# Patient Record
Sex: FEMALE | Race: WHITE | NOT HISPANIC OR LATINO
[De-identification: names, ages, dates, MRNs, and addresses within clinical notes are randomized per-mention and may not be internally consistent; named-entity substitution may affect disease eponyms.]

---

## 2017-03-09 ENCOUNTER — APPOINTMENT (OUTPATIENT)
Dept: BARIATRICS | Facility: CLINIC | Age: 62
End: 2017-03-09

## 2017-03-09 VITALS
WEIGHT: 226.5 LBS | HEIGHT: 62 IN | BODY MASS INDEX: 41.68 KG/M2 | DIASTOLIC BLOOD PRESSURE: 84 MMHG | SYSTOLIC BLOOD PRESSURE: 130 MMHG

## 2017-03-09 DIAGNOSIS — Z98.84 BARIATRIC SURGERY STATUS: ICD-10-CM

## 2017-03-09 DIAGNOSIS — E66.01 MORBID (SEVERE) OBESITY DUE TO EXCESS CALORIES: ICD-10-CM

## 2017-03-09 RX ORDER — PANTOPRAZOLE 40 MG/1
TABLET, DELAYED RELEASE ORAL
Refills: 0 | Status: ACTIVE | COMMUNITY

## 2017-04-21 ENCOUNTER — OUTPATIENT (OUTPATIENT)
Dept: OUTPATIENT SERVICES | Facility: HOSPITAL | Age: 62
LOS: 1 days | End: 2017-04-21

## 2017-05-05 ENCOUNTER — OUTPATIENT (OUTPATIENT)
Dept: OUTPATIENT SERVICES | Facility: HOSPITAL | Age: 62
LOS: 1 days | End: 2017-05-05

## 2017-05-05 ENCOUNTER — INPATIENT (INPATIENT)
Facility: HOSPITAL | Age: 62
LOS: 2 days | Discharge: NURSING, REHAB OR LONG-TERM CARE | End: 2017-05-08
Payer: MEDICARE

## 2017-05-05 PROCEDURE — 73560 X-RAY EXAM OF KNEE 1 OR 2: CPT | Mod: 26,RT

## 2017-05-06 ENCOUNTER — OUTPATIENT (OUTPATIENT)
Dept: OUTPATIENT SERVICES | Facility: HOSPITAL | Age: 62
LOS: 1 days | End: 2017-05-06

## 2017-05-07 ENCOUNTER — OUTPATIENT (OUTPATIENT)
Dept: OUTPATIENT SERVICES | Facility: HOSPITAL | Age: 62
LOS: 1 days | End: 2017-05-07

## 2017-05-08 ENCOUNTER — OUTPATIENT (OUTPATIENT)
Dept: OUTPATIENT SERVICES | Facility: HOSPITAL | Age: 62
LOS: 1 days | End: 2017-05-08

## 2017-05-08 ENCOUNTER — INPATIENT (INPATIENT)
Facility: HOSPITAL | Age: 62
LOS: 4 days | Discharge: HOME | End: 2017-05-13

## 2018-03-19 ENCOUNTER — APPOINTMENT (OUTPATIENT)
Dept: OBGYN | Facility: CLINIC | Age: 63
End: 2018-03-19

## 2018-11-09 ENCOUNTER — APPOINTMENT (OUTPATIENT)
Dept: OBGYN | Facility: CLINIC | Age: 63
End: 2018-11-09
Payer: MEDICARE

## 2018-11-09 VITALS
WEIGHT: 230 LBS | BODY MASS INDEX: 42.33 KG/M2 | DIASTOLIC BLOOD PRESSURE: 78 MMHG | RESPIRATION RATE: 16 BRPM | HEIGHT: 62 IN | TEMPERATURE: 98.5 F | SYSTOLIC BLOOD PRESSURE: 122 MMHG

## 2018-11-09 DIAGNOSIS — E66.9 OBESITY, UNSPECIFIED: ICD-10-CM

## 2018-11-09 LAB
BILIRUB UR QL STRIP: NORMAL
CLARITY UR: CLEAR
COLLECTION METHOD: NORMAL
GLUCOSE UR-MCNC: NORMAL
HCG UR QL: 0.2 EU/DL
HEMOGLOBIN: 15.8
HGB UR QL STRIP.AUTO: NORMAL
KETONES UR-MCNC: NORMAL
LEUKOCYTE ESTERASE UR QL STRIP: NORMAL
NITRITE UR QL STRIP: NORMAL
PH UR STRIP: 5
PROT UR STRIP-MCNC: NORMAL
SP GR UR STRIP: 1.02

## 2018-11-09 PROCEDURE — 81003 URINALYSIS AUTO W/O SCOPE: CPT | Mod: QW

## 2018-11-09 PROCEDURE — G0101: CPT

## 2018-11-09 PROCEDURE — 85018 HEMOGLOBIN: CPT | Mod: QW

## 2018-11-14 LAB — CYTOLOGY CVX/VAG DOC THIN PREP: NORMAL

## 2019-12-05 ENCOUNTER — APPOINTMENT (OUTPATIENT)
Dept: OBGYN | Facility: CLINIC | Age: 64
End: 2019-12-05

## 2021-10-07 ENCOUNTER — APPOINTMENT (OUTPATIENT)
Dept: OBGYN | Facility: CLINIC | Age: 66
End: 2021-10-07
Payer: MEDICARE

## 2021-10-07 ENCOUNTER — NON-APPOINTMENT (OUTPATIENT)
Age: 66
End: 2021-10-07

## 2021-10-07 VITALS
DIASTOLIC BLOOD PRESSURE: 68 MMHG | HEIGHT: 62 IN | WEIGHT: 230 LBS | SYSTOLIC BLOOD PRESSURE: 124 MMHG | BODY MASS INDEX: 42.33 KG/M2

## 2021-10-07 DIAGNOSIS — N39.41 URGE INCONTINENCE: ICD-10-CM

## 2021-10-07 DIAGNOSIS — Z01.419 ENCOUNTER FOR GYNECOLOGICAL EXAMINATION (GENERAL) (ROUTINE) W/OUT ABNORMAL FINDINGS: ICD-10-CM

## 2021-10-07 PROCEDURE — 82270 OCCULT BLOOD FECES: CPT

## 2021-10-07 PROCEDURE — G0101: CPT

## 2021-10-07 RX ORDER — MIRABEGRON 25 MG/1
25 TABLET, FILM COATED, EXTENDED RELEASE ORAL DAILY
Qty: 30 | Refills: 6 | Status: ACTIVE | COMMUNITY
Start: 2018-11-09 | End: 1900-01-01

## 2021-10-07 NOTE — REVIEW OF SYSTEMS
[Patient Intake Form Reviewed] : Patient intake form was reviewed [Urgency] : urgency [Incontinence] : incontinence [Negative] : Heme/Lymph

## 2021-10-07 NOTE — COUNSELING
[Nutrition/ Exercise/ Weight Management] : nutrition, exercise, weight management [Vitamins/Supplements] : vitamins/supplements [Breast Self Exam] : breast self exam [Bladder Hygiene] : bladder hygiene [Vaccines] : vaccines [Influenza Vaccine] : influenza vaccine

## 2021-10-07 NOTE — PLAN
[FreeTextEntry1] : PATIENT PRESENTS WITH URINARY INCONTINENCE. CLINICAL HISTORY REVIEWED. EXAMINATION PERFORMED. FINDINGS DISCUSSED. DISCUSSED TREATMENT OPTIONS WHICH INCLUDE MEDICAL THERAPY WITH ORAL ANTICHOLINERGICS, MYRBETIQ, BLADDER BOTOX, OR INTERSTIM. ALL OPTIONS REVIEWED WITH PATIENT. CONSULTATION ARRANGED WITH UROGYNECOLOGY

## 2021-10-07 NOTE — PHYSICAL EXAM
[Appropriately responsive] : appropriately responsive [Alert] : alert [No Acute Distress] : no acute distress [No Lymphadenopathy] : no lymphadenopathy [Regular Rate Rhythm] : regular rate rhythm [No Murmurs] : no murmurs [Clear to Auscultation B/L] : clear to auscultation bilaterally [Soft] : soft [Non-tender] : non-tender [Non-distended] : non-distended [No HSM] : No HSM [No Lesions] : no lesions [No Mass] : no mass [Oriented x3] : oriented x3 [Examination Of The Breasts] : a normal appearance [No Masses] : no breast masses were palpable [Labia Majora] : normal [Labia Minora] : normal [Atrophy] : atrophy [Uterine Adnexae] : normal [Normal rectal exam] : was normal [Normal Brown Stool] : was normal and brown [Occult Blood Positive] : was negative for occult blood analysis [Internal Hemorrhoid] : no internal hemorrhoids were present [External Hemorrhoid] : no external hemorrhoids were present [Skin Tags] : no residual hemorrhoidal skin tags [Normal] : was normal [None] : there was no rectal mass

## 2022-06-25 ENCOUNTER — EMERGENCY (EMERGENCY)
Facility: HOSPITAL | Age: 67
LOS: 0 days | Discharge: HOME | End: 2022-06-25
Attending: EMERGENCY MEDICINE
Payer: MEDICARE

## 2022-06-25 VITALS
HEART RATE: 80 BPM | SYSTOLIC BLOOD PRESSURE: 125 MMHG | DIASTOLIC BLOOD PRESSURE: 71 MMHG | RESPIRATION RATE: 16 BRPM | OXYGEN SATURATION: 99 % | TEMPERATURE: 98 F

## 2022-06-25 VITALS — HEIGHT: 62 IN | WEIGHT: 229.94 LBS

## 2022-06-25 DIAGNOSIS — S80.12XA CONTUSION OF LEFT LOWER LEG, INITIAL ENCOUNTER: ICD-10-CM

## 2022-06-25 DIAGNOSIS — R22.32 LOCALIZED SWELLING, MASS AND LUMP, LEFT UPPER LIMB: ICD-10-CM
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
## 2022-06-25 DIAGNOSIS — Z88.8 ALLERGY STATUS TO OTHER DRUGS, MEDICAMENTS AND BIOLOGICAL SUBSTANCES STATUS: ICD-10-CM

## 2022-06-25 DIAGNOSIS — C85.90 NON-HODGKIN LYMPHOMA, UNSPECIFIED, UNSPECIFIED SITE: ICD-10-CM

## 2022-06-25 DIAGNOSIS — S50.12XA CONTUSION OF LEFT FOREARM, INITIAL ENCOUNTER: ICD-10-CM
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
## 2022-06-25 PROCEDURE — 99282 EMERGENCY DEPT VISIT SF MDM: CPT

## 2022-06-25 PROCEDURE — 99282 EMERGENCY DEPT VISIT SF MDM: CPT | Mod: FS

## 2022-06-25 NOTE — ED STATDOCS - PATIENT PORTAL LINK FT
You can access the FollowMyHealth Patient Portal offered by [facility] by registering at the following website: [email or web address]. By joining [employer]’s FollowMyHealth portal, you will also be able to view your health information using other applications (apps) compatible with our system.

## 2022-06-25 NOTE — ED STATDOCS - NS ED ATTENDING STATEMENT MOD
This was a shared visit with the BRITTANIE. I reviewed and verified the documentation and independently performed the documented:

## 2022-06-25 NOTE — ED ADULT TRIAGE NOTE - CHIEF COMPLAINT QUOTE
pt presents to ED c/o circumferential discoloration to left shin that was noticed approximately 1hr PTa while pt was gardening. Of note, area is hard and tender to touch. denies trauma/insect bite. Denies itching. hx lymphoma

## 2022-06-25 NOTE — ED STATDOCS - ENMT, MLM
Nasal mucosa clear.  Mouth with normal mucosa  Throat has no vesicles, no oropharyngeal exudates and uvula is midline. Airway patent

## 2022-06-25 NOTE — ED STATDOCS - ATTENDING APP SHARED VISIT CONTRIBUTION OF CARE
I, Tri Rivas MD,  performed the initial face to face bedside interview with this patient regarding history of present illness, review of symptoms and relevant past medical, social and family history.  I completed an independent physical examination.  I was the initial provider who evaluated this patient.   I personally saw the patient and performed a substantive portion of the visit including all aspects of the medical decision making.  I have signed out the follow up of any pending tests (i.e. labs, radiological studies) to the BRITTANIE.  I have communicated the patient’s plan of care and disposition with the BRITTANIE.  The history, relevant review of systems, past medical and surgical history, medical decision making, and physical examination was documented by the scribe in my presence and I attest to the accuracy of the documentation.

## 2022-06-25 NOTE — ED STATDOCS - OBJECTIVE STATEMENT
67 year old female with PMHx of lymphoma presents to the ED c/o bruising and swelling to LUE. Denies recent trauma. Pt has FU with her PMD on Monday. No other injuries or complaints. 67 year old female with distant PMHx of lymphoma presents to the ED c/o bruising and swelling to LUE. Denies recent trauma. Pt has FU with her PMD on Monday. No other injuries or complaints.

## 2022-06-25 NOTE — ED STATDOCS - PROGRESS NOTE DETAILS
68 y/o F with PMH of lymphoma presents with left LE swelling and bruising. Unsure what caused bruising to start. Patient expressed concern about DVT. Denies trauma, numbness/tingling in extremities. Pt states she has her platelets checked every month, have been low in the past. No other reported injuries/areas of concern. PE: Well appearing. Cardiac: s1s2, RRR. Lungs: CTAB. Abdomen: NBS x4, soft, nontender. MSK: +ecchymosis to right medial thigh apx 6cm in diameter, left anterior leg apx 3cm in diameter. Both areas round, well demarcated, nontender to palpation. Trace edema adjacent to ecchymotic areas. Sensation intact to light tough in lower extremities. Cap refill < 2 sec in lower extremities. A/P: Ecchymosis, advised topical ice as needed follow up with PCP in 2 days as planned. - Luis Gutierrez PA-C

## 2022-06-25 NOTE — ED STATDOCS - SKIN, MLM
left shin with 3cm diameter hematoma, round and well circumscribed left shin with 3cm diameter superficial hematoma, round and well circumscribed

## 2022-09-09 ENCOUNTER — EMERGENCY (EMERGENCY)
Facility: HOSPITAL | Age: 67
LOS: 0 days | Discharge: HOME | End: 2022-09-09
Attending: STUDENT IN AN ORGANIZED HEALTH CARE EDUCATION/TRAINING PROGRAM
Payer: MEDICARE

## 2022-09-09 VITALS
SYSTOLIC BLOOD PRESSURE: 126 MMHG | TEMPERATURE: 98 F | DIASTOLIC BLOOD PRESSURE: 81 MMHG | RESPIRATION RATE: 18 BRPM | OXYGEN SATURATION: 100 % | HEART RATE: 69 BPM

## 2022-09-09 VITALS — WEIGHT: 229.94 LBS | HEIGHT: 62 IN

## 2022-09-09 DIAGNOSIS — Z20.822 CONTACT WITH AND (SUSPECTED) EXPOSURE TO COVID-19: ICD-10-CM

## 2022-09-09 DIAGNOSIS — M79.89 OTHER SPECIFIED SOFT TISSUE DISORDERS: ICD-10-CM

## 2022-09-09 DIAGNOSIS — Z88.8 ALLERGY STATUS TO OTHER DRUGS, MEDICAMENTS AND BIOLOGICAL SUBSTANCES: ICD-10-CM

## 2022-09-09 DIAGNOSIS — C85.90 NON-HODGKIN LYMPHOMA, UNSPECIFIED, UNSPECIFIED SITE: ICD-10-CM

## 2022-09-09 DIAGNOSIS — G62.9 POLYNEUROPATHY, UNSPECIFIED: ICD-10-CM

## 2022-09-09 LAB
ALBUMIN SERPL ELPH-MCNC: 3.5 G/DL — SIGNIFICANT CHANGE UP (ref 3.3–5)
ALP SERPL-CCNC: 157 U/L — HIGH (ref 40–120)
ALT FLD-CCNC: 57 U/L — SIGNIFICANT CHANGE UP (ref 12–78)
ANION GAP SERPL CALC-SCNC: 3 MMOL/L — LOW (ref 5–17)
AST SERPL-CCNC: 35 U/L — SIGNIFICANT CHANGE UP (ref 15–37)
BASOPHILS # BLD AUTO: 0.02 K/UL — SIGNIFICANT CHANGE UP (ref 0–0.2)
BASOPHILS NFR BLD AUTO: 0.6 % — SIGNIFICANT CHANGE UP (ref 0–2)
BILIRUB SERPL-MCNC: 0.9 MG/DL — SIGNIFICANT CHANGE UP (ref 0.2–1.2)
BUN SERPL-MCNC: 20 MG/DL — SIGNIFICANT CHANGE UP (ref 7–23)
CALCIUM SERPL-MCNC: 9.7 MG/DL — SIGNIFICANT CHANGE UP (ref 8.5–10.1)
CHLORIDE SERPL-SCNC: 107 MMOL/L — SIGNIFICANT CHANGE UP (ref 96–108)
CO2 SERPL-SCNC: 30 MMOL/L — SIGNIFICANT CHANGE UP (ref 22–31)
CREAT SERPL-MCNC: 0.76 MG/DL — SIGNIFICANT CHANGE UP (ref 0.5–1.3)
CRP SERPL-MCNC: 10 MG/L — HIGH
EGFR: 85 ML/MIN/1.73M2 — SIGNIFICANT CHANGE UP
EOSINOPHIL # BLD AUTO: 0.04 K/UL — SIGNIFICANT CHANGE UP (ref 0–0.5)
EOSINOPHIL NFR BLD AUTO: 1.2 % — SIGNIFICANT CHANGE UP (ref 0–6)
ERYTHROCYTE [SEDIMENTATION RATE] IN BLOOD: 28 MM/HR — HIGH (ref 0–20)
GLUCOSE SERPL-MCNC: 113 MG/DL — HIGH (ref 70–99)
HCT VFR BLD CALC: 46.9 % — HIGH (ref 34.5–45)
HGB BLD-MCNC: 16.2 G/DL — HIGH (ref 11.5–15.5)
IMM GRANULOCYTES NFR BLD AUTO: 0.6 % — SIGNIFICANT CHANGE UP (ref 0–1.5)
LYMPHOCYTES # BLD AUTO: 0.45 K/UL — LOW (ref 1–3.3)
LYMPHOCYTES # BLD AUTO: 13.7 % — SIGNIFICANT CHANGE UP (ref 13–44)
MCHC RBC-ENTMCNC: 32.7 PG — SIGNIFICANT CHANGE UP (ref 27–34)
MCHC RBC-ENTMCNC: 34.5 GM/DL — SIGNIFICANT CHANGE UP (ref 32–36)
MCV RBC AUTO: 94.6 FL — SIGNIFICANT CHANGE UP (ref 80–100)
MONOCYTES # BLD AUTO: 0.31 K/UL — SIGNIFICANT CHANGE UP (ref 0–0.9)
MONOCYTES NFR BLD AUTO: 9.4 % — SIGNIFICANT CHANGE UP (ref 2–14)
NEUTROPHILS # BLD AUTO: 2.45 K/UL — SIGNIFICANT CHANGE UP (ref 1.8–7.4)
NEUTROPHILS NFR BLD AUTO: 74.5 % — SIGNIFICANT CHANGE UP (ref 43–77)
PLATELET # BLD AUTO: 113 K/UL — LOW (ref 150–400)
POTASSIUM SERPL-MCNC: 4.1 MMOL/L — SIGNIFICANT CHANGE UP (ref 3.5–5.3)
POTASSIUM SERPL-SCNC: 4.1 MMOL/L — SIGNIFICANT CHANGE UP (ref 3.5–5.3)
PROCALCITONIN SERPL-MCNC: 0.09 NG/ML — SIGNIFICANT CHANGE UP (ref 0.02–0.1)
PROT SERPL-MCNC: 7.2 GM/DL — SIGNIFICANT CHANGE UP (ref 6–8.3)
RBC # BLD: 4.96 M/UL — SIGNIFICANT CHANGE UP (ref 3.8–5.2)
RBC # FLD: 14 % — SIGNIFICANT CHANGE UP (ref 10.3–14.5)
SARS-COV-2 RNA SPEC QL NAA+PROBE: SIGNIFICANT CHANGE UP
SODIUM SERPL-SCNC: 140 MMOL/L — SIGNIFICANT CHANGE UP (ref 135–145)
WBC # BLD: 3.29 K/UL — LOW (ref 3.8–10.5)
WBC # FLD AUTO: 3.29 K/UL — LOW (ref 3.8–10.5)

## 2022-09-09 PROCEDURE — U0003: CPT

## 2022-09-09 PROCEDURE — 86140 C-REACTIVE PROTEIN: CPT

## 2022-09-09 PROCEDURE — U0005: CPT

## 2022-09-09 PROCEDURE — 84145 PROCALCITONIN (PCT): CPT

## 2022-09-09 PROCEDURE — 99284 EMERGENCY DEPT VISIT MOD MDM: CPT | Mod: FS

## 2022-09-09 PROCEDURE — 85652 RBC SED RATE AUTOMATED: CPT

## 2022-09-09 PROCEDURE — 85025 COMPLETE CBC W/AUTO DIFF WBC: CPT

## 2022-09-09 PROCEDURE — 87040 BLOOD CULTURE FOR BACTERIA: CPT | Mod: 59

## 2022-09-09 PROCEDURE — 80053 COMPREHEN METABOLIC PANEL: CPT

## 2022-09-09 PROCEDURE — 36415 COLL VENOUS BLD VENIPUNCTURE: CPT

## 2022-09-09 PROCEDURE — 99283 EMERGENCY DEPT VISIT LOW MDM: CPT

## 2022-09-09 RX ADMIN — Medication 1 TABLET(S): at 16:26

## 2022-09-09 NOTE — ED STATDOCS - PROGRESS NOTE DETAILS
pt reeval and offers no complaints. pt states she is currently on Doxycycline given by her PMD for left lower leg cellulitis.  pt states her swelling increase during the day with increase standing. pt denies any fever, chills, trauma or any other complaints. pt states she has two negative US to r/o DVT.  pt states she does not want to stay if not needed she will fu with her PMD/ Cardiologist. pt well appearing on dc ambulating with no difficulty. -Francisca Best PA-C

## 2022-09-09 NOTE — ED STATDOCS - NS ED ROS FT
GENERAL: no fever, chills, fatigue, weight loss, night sweats  HEENT: no eye pain, discharge, conjunctivitis, ear pain, hearing loss, rhinorrhea, congestion, throat pain  CARDIAC: no chest pain, palpitations, lightheadedness, syncope  PULM: no dyspnea, wheezing  GI: no abdominal pain, nausea, vomiting, diarrhea, constipation, melena, hematochezia  : no urinary dysuria, frequency, incontinence, hematuria  NEURO: no headache, changes in vision, motor weakness, sensory changes  MSK: no joint pain, joint swelling, myalgias  SKIN: +LLE cellulitis   HEME: no active bleeding, excessive bruising

## 2022-09-09 NOTE — ED STATDOCS - PHYSICAL EXAMINATION
GENERAL: non-toxic appearing, in NAD  HEAD: atraumatic, normocephalic  EYES: vision grossly intact, no conjunctivitis or discharge  EARS: hearing grossly intact  NOSE: no nasal discharge, epistaxis   CARDIAC: RRR, normal S1S2,  no appreciable murmurs, no cyanosis, cap refill < 2 seconds  PULM: no respiratory distress, oxygen saturation on RA wnl, CTAB, no crackles, rales, rhonchi, or wheezing  GI: abdomen nondistended, soft, nontender, no guarding or rebound tenderness, no palpable masses  NEURO: awake and alert, follows commands, normal speech, PERRLA, EOMI, no focal motor or sensory deficits, normal gait  MSK: spine appears normal, no joint swelling or erythema, no gross deformities of extremities  EXT: no peripheral edema, calf tenderness, redness or swelling  SKIN: warm, dry, and intact, no rashes  PSYCH: appropriate mood and affect GENERAL: non-toxic appearing, in NAD  HEAD: atraumatic, normocephalic  EYES: vision grossly intact, no conjunctivitis or discharge  EARS: hearing grossly intact  NOSE: no nasal discharge, epistaxis   CARDIAC: RRR, normal S1S2,  no appreciable murmurs, no cyanosis, cap refill < 2 seconds  PULM: no respiratory distress, oxygen saturation on RA wnl, CTAB, no crackles, rales, rhonchi, or wheezing  GI: abdomen nondistended, soft, nontender, no guarding or rebound tenderness, no palpable masses  NEURO: awake and alert, follows commands, normal speech, PERRLA, EOMI, no focal motor or sensory deficits, normal gait  MSK: spine appears normal, no joint swelling or erythema, no gross deformities of extremities  EXT: no peripheral edema, calf tenderness. LLE diffusely edema, TTP and mildly warm to touch.   SKIN: warm, dry, and intact, no rashes  PSYCH: appropriate mood and affect

## 2022-09-09 NOTE — ED STATDOCS - OBJECTIVE STATEMENT
68 y/o female with a PMHx of lymphoma, neuropathy presents to the ED for LLE cellulitis x2 weeks. Pt finished course of abx prescribed by PCP Dr. Velazquez without relief. Pt started on Doxycycline at [facility] 2 days ago. Pt told to come to ED if no relief within 2 days. Pt had a doppler 3 days ago, reportedly negative. Allergies: Rituxan. No recent immobilization. No recent travel. No other complaints at this time.

## 2022-09-09 NOTE — ED ADULT TRIAGE NOTE - CHIEF COMPLAINT QUOTE
Pt presents to the ED c/o LLE cellulitis x2 weeks. Swelling and redness noted. Denies fevers. Pt finished course of antibiotics prescribed by PCP Dr. Velazquez without relief. Pt started on Doxycycline by [facility] on Wednesday. Pt told to come to ED if no relief within 2 days. PMH of lymphoma.    PMH of Lymphoma.     Dr. Velazquez tx

## 2022-09-09 NOTE — ED STATDOCS - PATIENT PORTAL LINK FT
You can access the FollowMyHealth Patient Portal offered by [facility] by registering at the following website: [email or web address]. By joining [employer]’s FollowMyHealth portal, you will also be able to view your health information using other applications (apps) compatible with our system.

## 2022-09-09 NOTE — ED STATDOCS - NS_ ATTENDINGSCRIBEDETAILS _ED_A_ED_FT
I, Yunior Mocteuzma MD,  performed the initial face to face bedside interview with this patient regarding history of present illness, review of symptoms and relevant past medical, social and family history.  I completed an independent physical examination.  I was the initial provider who evaluated this patient. I personally saw the patient and performed a substantive portion of the visit including all aspects of the medical decision making. The history, relevant review of systems, past medical and surgical history, medical decision making, and physical examination was documented by the scribe in my presence and I attest to the accuracy of the documentation.

## 2022-09-09 NOTE — ED STATDOCS - ATTENDING APP SHARED VISIT CONTRIBUTION OF CARE
I, Yunior Moctezuma MD, personally saw the patient with ACP.  I have personally performed a face-to-face diagnostic evaluation on this patient. I have reviewed the ACP note and agree with the history, exam, and plan of care, except as noted. I personally saw the patient and performed a substantive portion of the visit including all aspects of the medical decision making.

## 2022-09-09 NOTE — ED STATDOCS - NSFOLLOWUPINSTRUCTIONS_ED_ALL_ED_FT
ED evaluation and management discussed with the patient and family (if available) in detail.  Close PMD follow up encouraged.  Strict ED return instructions discussed in detail and patient given the opportunity to ask any questions about their discharge diagnosis and instructions. Patient verbalized understanding.      continue your antibiotics   follow up with your cardiologist   return to ED for any worsening of symptoms.

## 2022-09-14 LAB
CULTURE RESULTS: SIGNIFICANT CHANGE UP
CULTURE RESULTS: SIGNIFICANT CHANGE UP
SPECIMEN SOURCE: SIGNIFICANT CHANGE UP
SPECIMEN SOURCE: SIGNIFICANT CHANGE UP

## 2022-10-14 ENCOUNTER — APPOINTMENT (OUTPATIENT)
Dept: OBGYN | Facility: CLINIC | Age: 67
End: 2022-10-14

## 2023-02-16 ENCOUNTER — INPATIENT (INPATIENT)
Facility: HOSPITAL | Age: 68
LOS: 5 days | Discharge: HOME | DRG: 642 | End: 2023-02-22
Attending: INTERNAL MEDICINE | Admitting: INTERNAL MEDICINE
Payer: MEDICARE

## 2023-02-16 VITALS — WEIGHT: 209 LBS | HEIGHT: 62 IN

## 2023-02-16 DIAGNOSIS — T45.8X5A ADVERSE EFFECT OF OTHER PRIMARILY SYSTEMIC AND HEMATOLOGICAL AGENTS, INITIAL ENCOUNTER: ICD-10-CM

## 2023-02-16 DIAGNOSIS — Z88.8 ALLERGY STATUS TO OTHER DRUGS, MEDICAMENTS AND BIOLOGICAL SUBSTANCES STATUS: ICD-10-CM

## 2023-02-16 DIAGNOSIS — E03.9 HYPOTHYROIDISM, UNSPECIFIED: ICD-10-CM

## 2023-02-16 DIAGNOSIS — Z20.822 CONTACT WITH AND (SUSPECTED) EXPOSURE TO COVID-19: ICD-10-CM

## 2023-02-16 DIAGNOSIS — D64.81 ANEMIA DUE TO ANTINEOPLASTIC CHEMOTHERAPY: ICD-10-CM

## 2023-02-16 DIAGNOSIS — E43 UNSPECIFIED SEVERE PROTEIN-CALORIE MALNUTRITION: ICD-10-CM

## 2023-02-16 DIAGNOSIS — Z79.890 HORMONE REPLACEMENT THERAPY: ICD-10-CM

## 2023-02-16 DIAGNOSIS — C83.00 SMALL CELL B-CELL LYMPHOMA, UNSPECIFIED SITE: ICD-10-CM

## 2023-02-16 DIAGNOSIS — K59.00 CONSTIPATION, UNSPECIFIED: ICD-10-CM

## 2023-02-16 DIAGNOSIS — E83.119 HEMOCHROMATOSIS, UNSPECIFIED: ICD-10-CM

## 2023-02-16 DIAGNOSIS — D69.59 OTHER SECONDARY THROMBOCYTOPENIA: ICD-10-CM

## 2023-02-16 DIAGNOSIS — Z90.49 ACQUIRED ABSENCE OF OTHER SPECIFIED PARTS OF DIGESTIVE TRACT: ICD-10-CM

## 2023-02-16 DIAGNOSIS — Z79.899 OTHER LONG TERM (CURRENT) DRUG THERAPY: ICD-10-CM

## 2023-02-16 DIAGNOSIS — R17 UNSPECIFIED JAUNDICE: ICD-10-CM

## 2023-02-16 DIAGNOSIS — L29.9 PRURITUS, UNSPECIFIED: ICD-10-CM

## 2023-02-16 DIAGNOSIS — D72.825 BANDEMIA: ICD-10-CM

## 2023-02-16 LAB
ADD ON TEST-SPECIMEN IN LAB: SIGNIFICANT CHANGE UP
ADD ON TEST-SPECIMEN IN LAB: SIGNIFICANT CHANGE UP
ALBUMIN SERPL ELPH-MCNC: 2.6 G/DL — LOW (ref 3.3–5)
ALP SERPL-CCNC: 253 U/L — HIGH (ref 40–120)
ALT FLD-CCNC: 20 U/L — SIGNIFICANT CHANGE UP (ref 12–78)
ANION GAP SERPL CALC-SCNC: 2 MMOL/L — LOW (ref 5–17)
ANISOCYTOSIS BLD QL: SLIGHT — SIGNIFICANT CHANGE UP
APPEARANCE UR: CLEAR — SIGNIFICANT CHANGE UP
APTT BLD: 32.6 SEC — SIGNIFICANT CHANGE UP (ref 27.5–35.5)
AST SERPL-CCNC: 34 U/L — SIGNIFICANT CHANGE UP (ref 15–37)
BASO STIPL BLD QL SMEAR: PRESENT — SIGNIFICANT CHANGE UP
BASOPHILS # BLD AUTO: 0 K/UL — SIGNIFICANT CHANGE UP (ref 0–0.2)
BASOPHILS NFR BLD AUTO: 0 % — SIGNIFICANT CHANGE UP (ref 0–2)
BILIRUB SERPL-MCNC: 2.3 MG/DL — HIGH (ref 0.2–1.2)
BILIRUB UR-MCNC: NEGATIVE — SIGNIFICANT CHANGE UP
BLD GP AB SCN SERPL QL: SIGNIFICANT CHANGE UP
BUN SERPL-MCNC: 16 MG/DL — SIGNIFICANT CHANGE UP (ref 7–23)
CALCIUM SERPL-MCNC: 9.2 MG/DL — SIGNIFICANT CHANGE UP (ref 8.5–10.1)
CHLORIDE SERPL-SCNC: 105 MMOL/L — SIGNIFICANT CHANGE UP (ref 96–108)
CO2 SERPL-SCNC: 26 MMOL/L — SIGNIFICANT CHANGE UP (ref 22–31)
COLOR SPEC: YELLOW — SIGNIFICANT CHANGE UP
CREAT SERPL-MCNC: 0.67 MG/DL — SIGNIFICANT CHANGE UP (ref 0.5–1.3)
DIFF PNL FLD: NEGATIVE — SIGNIFICANT CHANGE UP
EGFR: 95 ML/MIN/1.73M2 — SIGNIFICANT CHANGE UP
EOSINOPHIL # BLD AUTO: 0 K/UL — SIGNIFICANT CHANGE UP (ref 0–0.5)
EOSINOPHIL NFR BLD AUTO: 0 % — SIGNIFICANT CHANGE UP (ref 0–6)
FLUAV AG NPH QL: SIGNIFICANT CHANGE UP
FLUBV AG NPH QL: SIGNIFICANT CHANGE UP
GLUCOSE SERPL-MCNC: 109 MG/DL — HIGH (ref 70–99)
GLUCOSE UR QL: NEGATIVE — SIGNIFICANT CHANGE UP
HCT VFR BLD CALC: 23 % — LOW (ref 34.5–45)
HGB BLD-MCNC: 7.8 G/DL — LOW (ref 11.5–15.5)
INR BLD: 1.1 RATIO — SIGNIFICANT CHANGE UP (ref 0.88–1.16)
KETONES UR-MCNC: NEGATIVE — SIGNIFICANT CHANGE UP
LACTATE SERPL-SCNC: 2 MMOL/L — SIGNIFICANT CHANGE UP (ref 0.7–2)
LDH SERPL L TO P-CCNC: 303 U/L — HIGH (ref 84–241)
LEUKOCYTE ESTERASE UR-ACNC: NEGATIVE — SIGNIFICANT CHANGE UP
LIDOCAIN IGE QN: 149 U/L — SIGNIFICANT CHANGE UP (ref 73–393)
LYMPHOCYTES # BLD AUTO: 0.33 K/UL — LOW (ref 1–3.3)
LYMPHOCYTES # BLD AUTO: 6 % — LOW (ref 13–44)
MACROCYTES BLD QL: SIGNIFICANT CHANGE UP
MANUAL SMEAR VERIFICATION: SIGNIFICANT CHANGE UP
MCHC RBC-ENTMCNC: 33.9 GM/DL — SIGNIFICANT CHANGE UP (ref 32–36)
MCHC RBC-ENTMCNC: 38 PG — HIGH (ref 27–34)
MCV RBC AUTO: 112.2 FL — HIGH (ref 80–100)
METAMYELOCYTES # FLD: 2 % — HIGH (ref 0–0)
MONOCYTES # BLD AUTO: 0.56 K/UL — SIGNIFICANT CHANGE UP (ref 0–0.9)
MONOCYTES NFR BLD AUTO: 10 % — SIGNIFICANT CHANGE UP (ref 2–14)
NEUTROPHILS # BLD AUTO: 4.55 K/UL — SIGNIFICANT CHANGE UP (ref 1.8–7.4)
NEUTROPHILS NFR BLD AUTO: 65 % — SIGNIFICANT CHANGE UP (ref 43–77)
NEUTS BAND # BLD: 17 % — HIGH (ref 0–8)
NITRITE UR-MCNC: NEGATIVE — SIGNIFICANT CHANGE UP
NRBC # BLD: 1 /100 — HIGH (ref 0–0)
NRBC # BLD: SIGNIFICANT CHANGE UP /100 WBCS (ref 0–0)
PH UR: 5 — SIGNIFICANT CHANGE UP (ref 5–8)
PLAT MORPH BLD: NORMAL — SIGNIFICANT CHANGE UP
PLATELET # BLD AUTO: 90 K/UL — LOW (ref 150–400)
POLYCHROMASIA BLD QL SMEAR: SLIGHT — SIGNIFICANT CHANGE UP
POTASSIUM SERPL-MCNC: 4.2 MMOL/L — SIGNIFICANT CHANGE UP (ref 3.5–5.3)
POTASSIUM SERPL-SCNC: 4.2 MMOL/L — SIGNIFICANT CHANGE UP (ref 3.5–5.3)
PROT SERPL-MCNC: 9.1 GM/DL — HIGH (ref 6–8.3)
PROT UR-MCNC: NEGATIVE — SIGNIFICANT CHANGE UP
PROTHROM AB SERPL-ACNC: 12.8 SEC — SIGNIFICANT CHANGE UP (ref 10.5–13.4)
RBC # BLD: 2.05 M/UL — LOW (ref 3.8–5.2)
RBC # FLD: 22.3 % — HIGH (ref 10.3–14.5)
RBC BLD AUTO: ABNORMAL
RSV RNA NPH QL NAA+NON-PROBE: SIGNIFICANT CHANGE UP
SARS-COV-2 RNA SPEC QL NAA+PROBE: SIGNIFICANT CHANGE UP
SODIUM SERPL-SCNC: 133 MMOL/L — LOW (ref 135–145)
SP GR SPEC: 1.01 — SIGNIFICANT CHANGE UP (ref 1.01–1.02)
UROBILINOGEN FLD QL: 1
WBC # BLD: 5.55 K/UL — SIGNIFICANT CHANGE UP (ref 3.8–10.5)
WBC # FLD AUTO: 5.55 K/UL — SIGNIFICANT CHANGE UP (ref 3.8–10.5)

## 2023-02-16 PROCEDURE — 85652 RBC SED RATE AUTOMATED: CPT

## 2023-02-16 PROCEDURE — 82784 ASSAY IGA/IGD/IGG/IGM EACH: CPT

## 2023-02-16 PROCEDURE — 76942 ECHO GUIDE FOR BIOPSY: CPT

## 2023-02-16 PROCEDURE — 83540 ASSAY OF IRON: CPT

## 2023-02-16 PROCEDURE — 84155 ASSAY OF PROTEIN SERUM: CPT

## 2023-02-16 PROCEDURE — 83550 IRON BINDING TEST: CPT

## 2023-02-16 PROCEDURE — 86334 IMMUNOFIX E-PHORESIS SERUM: CPT

## 2023-02-16 PROCEDURE — 84165 PROTEIN E-PHORESIS SERUM: CPT

## 2023-02-16 PROCEDURE — 86803 HEPATITIS C AB TEST: CPT

## 2023-02-16 PROCEDURE — 87350 HEPATITIS BE AG IA: CPT

## 2023-02-16 PROCEDURE — 82728 ASSAY OF FERRITIN: CPT

## 2023-02-16 PROCEDURE — 85810 BLOOD VISCOSITY EXAMINATION: CPT

## 2023-02-16 PROCEDURE — 36415 COLL VENOUS BLD VENIPUNCTURE: CPT

## 2023-02-16 PROCEDURE — 99285 EMERGENCY DEPT VISIT HI MDM: CPT | Mod: FS

## 2023-02-16 PROCEDURE — 94640 AIRWAY INHALATION TREATMENT: CPT

## 2023-02-16 PROCEDURE — 86705 HEP B CORE ANTIBODY IGM: CPT

## 2023-02-16 PROCEDURE — 80053 COMPREHEN METABOLIC PANEL: CPT

## 2023-02-16 PROCEDURE — 82248 BILIRUBIN DIRECT: CPT

## 2023-02-16 PROCEDURE — 85027 COMPLETE CBC AUTOMATED: CPT

## 2023-02-16 PROCEDURE — 86140 C-REACTIVE PROTEIN: CPT

## 2023-02-16 PROCEDURE — 88307 TISSUE EXAM BY PATHOLOGIST: CPT

## 2023-02-16 PROCEDURE — 86707 HEPATITIS BE ANTIBODY: CPT

## 2023-02-16 PROCEDURE — 82247 BILIRUBIN TOTAL: CPT

## 2023-02-16 PROCEDURE — 82150 ASSAY OF AMYLASE: CPT

## 2023-02-16 PROCEDURE — 74177 CT ABD & PELVIS W/CONTRAST: CPT | Mod: 26,MA

## 2023-02-16 PROCEDURE — 93010 ELECTROCARDIOGRAM REPORT: CPT

## 2023-02-16 PROCEDURE — 86704 HEP B CORE ANTIBODY TOTAL: CPT

## 2023-02-16 PROCEDURE — 87635 SARS-COV-2 COVID-19 AMP PRB: CPT

## 2023-02-16 PROCEDURE — 86706 HEP B SURFACE ANTIBODY: CPT

## 2023-02-16 PROCEDURE — 87340 HEPATITIS B SURFACE AG IA: CPT

## 2023-02-16 PROCEDURE — 47000 NEEDLE BIOPSY OF LIVER PERQ: CPT

## 2023-02-16 PROCEDURE — 88313 SPECIAL STAINS GROUP 2: CPT

## 2023-02-16 PROCEDURE — 83690 ASSAY OF LIPASE: CPT

## 2023-02-16 PROCEDURE — 93306 TTE W/DOPPLER COMPLETE: CPT

## 2023-02-16 PROCEDURE — 74181 MRI ABDOMEN W/O CONTRAST: CPT

## 2023-02-16 RX ORDER — CHOLECALCIFEROL (VITAMIN D3) 125 MCG
1 CAPSULE ORAL
Qty: 0 | Refills: 0 | DISCHARGE

## 2023-02-16 RX ORDER — ZINC SULFATE TAB 220 MG (50 MG ZINC EQUIVALENT) 220 (50 ZN) MG
1 TAB ORAL
Qty: 0 | Refills: 0 | DISCHARGE

## 2023-02-16 RX ORDER — ONDANSETRON 8 MG/1
4 TABLET, FILM COATED ORAL ONCE
Refills: 0 | Status: COMPLETED | OUTPATIENT
Start: 2023-02-16 | End: 2023-02-17

## 2023-02-16 RX ORDER — MONTELUKAST 4 MG/1
1 TABLET, CHEWABLE ORAL
Qty: 0 | Refills: 0 | DISCHARGE

## 2023-02-16 RX ORDER — VENETOCLAX 100 MG/1
4 TABLET, FILM COATED ORAL
Qty: 0 | Refills: 0 | DISCHARGE

## 2023-02-16 RX ORDER — AZELASTINE HYDROCHLORIDE AND FLUTICASONE PROPIONATE 137; 50 UG/1; UG/1
1 SPRAY, METERED NASAL
Qty: 0 | Refills: 0 | DISCHARGE

## 2023-02-16 RX ORDER — ALLOPURINOL 300 MG
1 TABLET ORAL
Qty: 0 | Refills: 0 | DISCHARGE

## 2023-02-16 RX ORDER — DULOXETINE HYDROCHLORIDE 30 MG/1
4 CAPSULE, DELAYED RELEASE ORAL
Qty: 0 | Refills: 0 | DISCHARGE

## 2023-02-16 RX ORDER — FLUTICASONE PROPIONATE 50 MCG
1 SPRAY, SUSPENSION NASAL
Qty: 0 | Refills: 0 | DISCHARGE

## 2023-02-16 RX ORDER — LEVOTHYROXINE SODIUM 125 MCG
1 TABLET ORAL
Qty: 0 | Refills: 0 | DISCHARGE

## 2023-02-16 RX ORDER — ALPRAZOLAM 0.25 MG
1 TABLET ORAL
Qty: 0 | Refills: 0 | DISCHARGE

## 2023-02-16 RX ORDER — PREGABALIN 225 MG/1
1 CAPSULE ORAL
Qty: 0 | Refills: 0 | DISCHARGE

## 2023-02-16 RX ORDER — TRAMADOL HYDROCHLORIDE 50 MG/1
25 TABLET ORAL ONCE
Refills: 0 | Status: DISCONTINUED | OUTPATIENT
Start: 2023-02-16 | End: 2023-02-17

## 2023-02-16 RX ORDER — TRAMADOL HYDROCHLORIDE 50 MG/1
25 TABLET ORAL ONCE
Refills: 0 | Status: DISCONTINUED | OUTPATIENT
Start: 2023-02-16 | End: 2023-02-16

## 2023-02-16 RX ORDER — ONDANSETRON 8 MG/1
4 TABLET, FILM COATED ORAL ONCE
Refills: 0 | Status: COMPLETED | OUTPATIENT
Start: 2023-02-16 | End: 2023-02-16

## 2023-02-16 RX ORDER — PANTOPRAZOLE SODIUM 20 MG/1
1 TABLET, DELAYED RELEASE ORAL
Qty: 0 | Refills: 0 | DISCHARGE

## 2023-02-16 RX ORDER — MORPHINE SULFATE 50 MG/1
4 CAPSULE, EXTENDED RELEASE ORAL ONCE
Refills: 0 | Status: DISCONTINUED | OUTPATIENT
Start: 2023-02-16 | End: 2023-02-16

## 2023-02-16 RX ORDER — ZANUBRUTINIB 80 MG/1
1 CAPSULE, GELATIN COATED ORAL
Qty: 0 | Refills: 0 | DISCHARGE

## 2023-02-16 RX ORDER — ACETAMINOPHEN 500 MG
2 TABLET ORAL
Qty: 0 | Refills: 0 | DISCHARGE

## 2023-02-16 RX ADMIN — TRAMADOL HYDROCHLORIDE 25 MILLIGRAM(S): 50 TABLET ORAL at 18:27

## 2023-02-16 RX ADMIN — ONDANSETRON 4 MILLIGRAM(S): 8 TABLET, FILM COATED ORAL at 18:27

## 2023-02-16 RX ADMIN — TRAMADOL HYDROCHLORIDE 25 MILLIGRAM(S): 50 TABLET ORAL at 18:57

## 2023-02-16 NOTE — ED ADULT TRIAGE NOTE - CHIEF COMPLAINT QUOTE
Pt presents to ER c/o RUQ pain, weakness and nausea. Pt currently on trial chemo. Onset of symptoms began a couple weeks ago and exacerbated after eating. Hx of cholecystectomy

## 2023-02-16 NOTE — PATIENT PROFILE ADULT - FALL HARM RISK - HARM RISK INTERVENTIONS

## 2023-02-16 NOTE — ED STATDOCS - CLINICAL SUMMARY MEDICAL DECISION MAKING FREE TEXT BOX
Patient with jaundice, unclear etiology.  Low hemoglobin, haptoglobin and LDH sent, macrocytic, no evidence of bleeding.  Bilirubin elevation, alk phos is elevated, but LFTs WNL.  CT scan of abdomen was normal no signs of mass, infection, normal liver ducts.  She was given morphine and Zofran for pain and nausea.  Plan for admission to medicine service for further evaluation and management.

## 2023-02-16 NOTE — ED STATDOCS - OBJECTIVE STATEMENT
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 Pt also notes fever, nausea, yellowed eyes. Denies vomiting. Pt was in hospital 2 weeks ago for 2 weeks for similar symptoms. Pt has h/o cholecystectomy. No other injuries or complaints.

## 2023-02-16 NOTE — ED STATDOCS - CARE PLAN
1 Principal Discharge DX:	Jaundice  Secondary Diagnosis:	Abdominal pain  Secondary Diagnosis:	Bandemia without diagnosis of specific infection   Principal Discharge DX:	Jaundice  Secondary Diagnosis:	Abdominal pain  Secondary Diagnosis:	Bandemia without diagnosis of specific infection  Secondary Diagnosis:	Macrocytic anemia

## 2023-02-16 NOTE — ED STATDOCS - PROGRESS NOTE DETAILS
69 y/o F with PMH of Waldenstrom lymphoma on PO chemo trial (Venetoclax) presents with persistent abdominal pain x 2 weeks. +fever, nausea, vomiting, yellow eye discoloration. Noted history of cholecystectomy. Was previously admitted and dc'd from outside hospital 2 weeks ago for same. Had negative US and CT during admission. Upon dc was advised to follow up with hepatologist, but has been unable to obtain appointment since dc. Was advised to go to ED by her PCP due to worsening pain and jaundice. PE: Well appearing. A&O x3. +jaundice HEENT: PERRLA, EOMI. +icteric sclera. Cardiac: s1s2, RRR. lungs: CTAB, Abdomen: NBS x4, soft, +RUQ tenderness. A/p; abdominal pain, jaundice, concern for liver mets, biliary obstruction. Plan for labs, analgesia, antiemetics, CT, admission. - Luis Gutierrez PA-C Patient refusing morphine, requesting tramadol. States she has taken previously for knee surgery. Will provide 25mg tramadol. - Luis Gutierrez PA-C Findings reviewed with patient. Noted bandemia. WBC 5. Elevated alk phos ~250, bili 2.3. CT unremarkable. Blood cultures and lactate ordered. Pt admitted to  for further assessment. - Luis Gutierrez PA-C

## 2023-02-17 LAB
ALBUMIN SERPL ELPH-MCNC: 2.4 G/DL — LOW (ref 3.3–5)
ALP SERPL-CCNC: 223 U/L — HIGH (ref 40–120)
ALT FLD-CCNC: 16 U/L — SIGNIFICANT CHANGE UP (ref 12–78)
ANION GAP SERPL CALC-SCNC: 5 MMOL/L — SIGNIFICANT CHANGE UP (ref 5–17)
AST SERPL-CCNC: 28 U/L — SIGNIFICANT CHANGE UP (ref 15–37)
BILIRUB SERPL-MCNC: 2 MG/DL — HIGH (ref 0.2–1.2)
BUN SERPL-MCNC: 13 MG/DL — SIGNIFICANT CHANGE UP (ref 7–23)
CALCIUM SERPL-MCNC: 8.9 MG/DL — SIGNIFICANT CHANGE UP (ref 8.5–10.1)
CHLORIDE SERPL-SCNC: 106 MMOL/L — SIGNIFICANT CHANGE UP (ref 96–108)
CO2 SERPL-SCNC: 24 MMOL/L — SIGNIFICANT CHANGE UP (ref 22–31)
CREAT SERPL-MCNC: 0.64 MG/DL — SIGNIFICANT CHANGE UP (ref 0.5–1.3)
EGFR: 96 ML/MIN/1.73M2 — SIGNIFICANT CHANGE UP
ERYTHROCYTE [SEDIMENTATION RATE] IN BLOOD: >140 — SIGNIFICANT CHANGE UP (ref 0–20)
GLUCOSE SERPL-MCNC: 111 MG/DL — HIGH (ref 70–99)
HAPTOGLOB SERPL-MCNC: <20 MG/DL — LOW (ref 34–200)
HCT VFR BLD CALC: 22.5 % — LOW (ref 34.5–45)
HCV AB S/CO SERPL IA: 0.13 S/CO — SIGNIFICANT CHANGE UP (ref 0–0.99)
HCV AB SERPL-IMP: SIGNIFICANT CHANGE UP
HGB BLD-MCNC: 7.4 G/DL — LOW (ref 11.5–15.5)
MCHC RBC-ENTMCNC: 32.9 GM/DL — SIGNIFICANT CHANGE UP (ref 32–36)
MCHC RBC-ENTMCNC: 37.8 PG — HIGH (ref 27–34)
MCV RBC AUTO: 114.8 FL — HIGH (ref 80–100)
NRBC # BLD: 2 /100 WBCS — HIGH (ref 0–0)
PLATELET # BLD AUTO: 81 K/UL — LOW (ref 150–400)
POTASSIUM SERPL-MCNC: 3.9 MMOL/L — SIGNIFICANT CHANGE UP (ref 3.5–5.3)
POTASSIUM SERPL-SCNC: 3.9 MMOL/L — SIGNIFICANT CHANGE UP (ref 3.5–5.3)
PROT SERPL-MCNC: 8.4 GM/DL — HIGH (ref 6–8.3)
RBC # BLD: 1.96 M/UL — LOW (ref 3.8–5.2)
RBC # FLD: 22.5 % — HIGH (ref 10.3–14.5)
SODIUM SERPL-SCNC: 135 MMOL/L — SIGNIFICANT CHANGE UP (ref 135–145)
WBC # BLD: 5.6 K/UL — SIGNIFICANT CHANGE UP (ref 3.8–10.5)
WBC # FLD AUTO: 5.6 K/UL — SIGNIFICANT CHANGE UP (ref 3.8–10.5)

## 2023-02-17 PROCEDURE — 12345: CPT | Mod: NC

## 2023-02-17 PROCEDURE — 74181 MRI ABDOMEN W/O CONTRAST: CPT | Mod: 26

## 2023-02-17 PROCEDURE — 99222 1ST HOSP IP/OBS MODERATE 55: CPT

## 2023-02-17 RX ORDER — TRAMADOL HYDROCHLORIDE 50 MG/1
25 TABLET ORAL EVERY 4 HOURS
Refills: 0 | Status: DISCONTINUED | OUTPATIENT
Start: 2023-02-17 | End: 2023-02-22

## 2023-02-17 RX ORDER — DULOXETINE HYDROCHLORIDE 30 MG/1
30 CAPSULE, DELAYED RELEASE ORAL DAILY
Refills: 0 | Status: DISCONTINUED | OUTPATIENT
Start: 2023-02-17 | End: 2023-02-18

## 2023-02-17 RX ORDER — DIPHENHYDRAMINE HCL 50 MG
25 CAPSULE ORAL AT BEDTIME
Refills: 0 | Status: DISCONTINUED | OUTPATIENT
Start: 2023-02-17 | End: 2023-02-20

## 2023-02-17 RX ORDER — FLUTICASONE PROPIONATE 50 MCG
1 SPRAY, SUSPENSION NASAL
Refills: 0 | Status: DISCONTINUED | OUTPATIENT
Start: 2023-02-17 | End: 2023-02-22

## 2023-02-17 RX ORDER — MONTELUKAST 4 MG/1
10 TABLET, CHEWABLE ORAL DAILY
Refills: 0 | Status: DISCONTINUED | OUTPATIENT
Start: 2023-02-17 | End: 2023-02-22

## 2023-02-17 RX ORDER — SODIUM CHLORIDE 9 MG/ML
1000 INJECTION INTRAMUSCULAR; INTRAVENOUS; SUBCUTANEOUS
Refills: 0 | Status: DISCONTINUED | OUTPATIENT
Start: 2023-02-17 | End: 2023-02-22

## 2023-02-17 RX ORDER — ACETAMINOPHEN 500 MG
650 TABLET ORAL EVERY 6 HOURS
Refills: 0 | Status: DISCONTINUED | OUTPATIENT
Start: 2023-02-17 | End: 2023-02-22

## 2023-02-17 RX ORDER — DIPHENHYDRAMINE HCL 50 MG
25 CAPSULE ORAL ONCE
Refills: 0 | Status: COMPLETED | OUTPATIENT
Start: 2023-02-17 | End: 2023-02-17

## 2023-02-17 RX ORDER — DIPHENHYDRAMINE HCL 50 MG
25 CAPSULE ORAL EVERY 6 HOURS
Refills: 0 | Status: DISCONTINUED | OUTPATIENT
Start: 2023-02-17 | End: 2023-02-22

## 2023-02-17 RX ORDER — LEVOTHYROXINE SODIUM 125 MCG
150 TABLET ORAL DAILY
Refills: 0 | Status: DISCONTINUED | OUTPATIENT
Start: 2023-02-17 | End: 2023-02-22

## 2023-02-17 RX ORDER — ALPRAZOLAM 0.25 MG
0.5 TABLET ORAL THREE TIMES A DAY
Refills: 0 | Status: DISCONTINUED | OUTPATIENT
Start: 2023-02-17 | End: 2023-02-22

## 2023-02-17 RX ORDER — PANTOPRAZOLE SODIUM 20 MG/1
40 TABLET, DELAYED RELEASE ORAL
Refills: 0 | Status: DISCONTINUED | OUTPATIENT
Start: 2023-02-17 | End: 2023-02-22

## 2023-02-17 RX ORDER — ALLOPURINOL 300 MG
300 TABLET ORAL DAILY
Refills: 0 | Status: DISCONTINUED | OUTPATIENT
Start: 2023-02-17 | End: 2023-02-22

## 2023-02-17 RX ORDER — LANOLIN ALCOHOL/MO/W.PET/CERES
3 CREAM (GRAM) TOPICAL AT BEDTIME
Refills: 0 | Status: DISCONTINUED | OUTPATIENT
Start: 2023-02-17 | End: 2023-02-22

## 2023-02-17 RX ORDER — ONDANSETRON 8 MG/1
4 TABLET, FILM COATED ORAL EVERY 8 HOURS
Refills: 0 | Status: DISCONTINUED | OUTPATIENT
Start: 2023-02-17 | End: 2023-02-22

## 2023-02-17 RX ADMIN — TRAMADOL HYDROCHLORIDE 25 MILLIGRAM(S): 50 TABLET ORAL at 05:34

## 2023-02-17 RX ADMIN — TRAMADOL HYDROCHLORIDE 25 MILLIGRAM(S): 50 TABLET ORAL at 16:00

## 2023-02-17 RX ADMIN — TRAMADOL HYDROCHLORIDE 25 MILLIGRAM(S): 50 TABLET ORAL at 06:34

## 2023-02-17 RX ADMIN — Medication 150 MICROGRAM(S): at 05:18

## 2023-02-17 RX ADMIN — TRAMADOL HYDROCHLORIDE 25 MILLIGRAM(S): 50 TABLET ORAL at 12:16

## 2023-02-17 RX ADMIN — TRAMADOL HYDROCHLORIDE 25 MILLIGRAM(S): 50 TABLET ORAL at 02:12

## 2023-02-17 RX ADMIN — ONDANSETRON 4 MILLIGRAM(S): 8 TABLET, FILM COATED ORAL at 01:13

## 2023-02-17 RX ADMIN — MONTELUKAST 10 MILLIGRAM(S): 4 TABLET, CHEWABLE ORAL at 09:48

## 2023-02-17 RX ADMIN — SODIUM CHLORIDE 100 MILLILITER(S): 9 INJECTION INTRAMUSCULAR; INTRAVENOUS; SUBCUTANEOUS at 01:40

## 2023-02-17 RX ADMIN — Medication 1 SPRAY(S): at 21:29

## 2023-02-17 RX ADMIN — ONDANSETRON 4 MILLIGRAM(S): 8 TABLET, FILM COATED ORAL at 15:34

## 2023-02-17 RX ADMIN — TRAMADOL HYDROCHLORIDE 25 MILLIGRAM(S): 50 TABLET ORAL at 01:12

## 2023-02-17 RX ADMIN — PANTOPRAZOLE SODIUM 40 MILLIGRAM(S): 20 TABLET, DELAYED RELEASE ORAL at 05:19

## 2023-02-17 RX ADMIN — Medication 25 MILLIGRAM(S): at 23:09

## 2023-02-17 RX ADMIN — TRAMADOL HYDROCHLORIDE 25 MILLIGRAM(S): 50 TABLET ORAL at 11:46

## 2023-02-17 RX ADMIN — TRAMADOL HYDROCHLORIDE 25 MILLIGRAM(S): 50 TABLET ORAL at 15:33

## 2023-02-17 NOTE — H&P ADULT - HISTORY OF PRESENT ILLNESS
68 year old Female with PMHx of Waldenstrom lymphoma dx 15 years ago on oral chemo trial Venetoclax presented to the ED with complain of abdominal pain, dark urine, yellowish stool. Patient also notes fever in the evening, nausea, yellowed eyes. Denies vomiting. Patient was in hospital in [place] 2 weeks ago for 2 weeks for similar symptoms. But no specific reason for her symptoms were identified She was advised to come to hospital if her symptoms get worse. As per Abdominal pain is worse than before, she has come to the hospital again. Her Abdominal pain is very localized in RUQ area. Patient has h/o cholecystectomy. No other injuries or complaints.

## 2023-02-17 NOTE — CHART NOTE - NSCHARTNOTEFT_GEN_A_CORE
Per RN pt is requesting PO benadryl instead of IV benadryl. Will order a 1X dose of PO benadryl 25mg. Advised RN to hold IV benadryl for the night. Pt also requesting cymbalta 30mg 4 tablets QD. However, will hold off on increasing dose now since pt being worked up for jaundice and RUQ pain. Per RN pt is requesting PO benadryl instead of IV benadryl. Will order a 1X dose of PO benadryl 25mg. Advised RN to hold IV benadryl for the night. Pt also requesting cymbalta 30mg 4 tablets QD. However, will hold off on increasing dose now since pt being worked up for jaundice and RUQ pain.    Update: Assessed pt at bedside. Pt is very tearful and very upset that she was not given 120mg of cymbalta. Unsure why pt was not started on 120mg Cymbalta (it's on home outpt med rec), but pt being worked up for Jaundice and RUQ pain. d/w Senior resident and also Dr. Reyes. OK to give the full 120 mg now sine no transaminitis

## 2023-02-17 NOTE — CHART NOTE - NSCHARTNOTEFT_GEN_A_CORE
MRCP results d/w patient. she also has co itching. IV bendaryl ordered.   no family hx of liver disease.     Iron studies / HFe ordered/ Hep B ordered.     Echo ordered to r/o cardiomyopathy    Hem/onc consult placed as patient states spleen findings may not be knew, but she is not sure.

## 2023-02-17 NOTE — DIETITIAN INITIAL EVALUATION ADULT - PERTINENT MEDS FT
MEDICATIONS  (STANDING):  allopurinol 300 milliGRAM(s) Oral daily  DULoxetine 30 milliGRAM(s) Oral daily  fluticasone propionate 50 MICROgram(s)/spray Nasal Spray 1 Spray(s) Both Nostrils two times a day  levothyroxine 150 MICROGram(s) Oral daily  montelukast 10 milliGRAM(s) Oral daily  pantoprazole    Tablet 40 milliGRAM(s) Oral before breakfast  sodium chloride 0.9%. 1000 milliLiter(s) (100 mL/Hr) IV Continuous <Continuous>    MEDICATIONS  (PRN):  acetaminophen     Tablet .. 650 milliGRAM(s) Oral every 6 hours PRN Temp greater or equal to 38C (100.4F), Mild Pain (1 - 3)  ALPRAZolam 0.5 milliGRAM(s) Oral three times a day PRN anxiety  aluminum hydroxide/magnesium hydroxide/simethicone Suspension 30 milliLiter(s) Oral every 4 hours PRN Dyspepsia  melatonin 3 milliGRAM(s) Oral at bedtime PRN Insomnia  ondansetron Injectable 4 milliGRAM(s) IV Push every 8 hours PRN Nausea and/or Vomiting  **Receiving 2/2 c/o persistent nausea**  traMADol 25 milliGRAM(s) Oral every 4 hours PRN Moderate Pain (4 - 6)                       **Receiving 2/2 c/o abdominal pain**    **Not on bowel regimen**

## 2023-02-17 NOTE — H&P ADULT - NSHPREVIEWOFSYSTEMS_GEN_ALL_CORE
Gen: ++ fever, chills, weakness  ENT: No visual changes or throat pain  Neck: No pain or stiffness  Respiratory: No cough or wheezing  Cardiovascular: No chest pain or palpitations  Gastrointestinal: ++ abdominal pain, ++nausea, ++vomiting, no constipation, or no diarrhea  Hematologic: No easy bleeding or bruising  Neurologic: No numbness or focal weakness  Psych: No depression or insomnia  Skin: No rash or itching

## 2023-02-17 NOTE — DIETITIAN INITIAL EVALUATION ADULT - ADD RECOMMEND
1) Recommend to continue w/ regular, low microbial diet and add easy to chew consistency 2/2 c/o difficult chewing to increase PO intake.  2) Add Ensure Max BID to increase calorie and protein intake (provides 150 kcals, 30 g pro/ shake).   3) Encourage protein-rich foods, maximize food preferences.   4) Recommend daily MVI w/ minerals to meet 100% RDA needs.   5) Suggest 200 mg thiamine suppl daily 2/2 poor PO intake/malnutrition.   6) Continue w/ Zofran prn to relieve c/o nausea and to increase PO intake.   7) Continue to monitor bowel movements, if no BM for >3 days, consider implementing bowel regimen.   8) Obtain vit D 25OH level and if low, supplement accordingly.   9) Confirm goals of care regarding nutrition support.   RD will continue to monitor PO intake, labs, hydration, and wt prn.

## 2023-02-17 NOTE — DIETITIAN INITIAL EVALUATION ADULT - PERTINENT LABORATORY DATA
02-17    135  |  106  |  13  ----------------------------<  111<H>  3.9   |  24  |  0.64    Ca    8.9      17 Feb 2023 07:13    TPro  8.4<H>  /  Alb  2.4<L>  /  TBili  2.0<H>  /  DBili  x   /  AST  28  /  ALT  16  /  AlkPhos  223<H>  02-17    *Decreased Na 2/2 possible edema / starvation / hyperglycemia; Elevated alk. phos 2/2 possible hepatic disease or metastasis / hepatitis / vit D def.

## 2023-02-17 NOTE — DIETITIAN INITIAL EVALUATION ADULT - NAME AND PHONE
Reina Nielson, Dietetic Intern  Reina Nielson, Dietetic Intern   Ana María Ansari M.S., Registered Dietitian Nutritionist [phone]

## 2023-02-17 NOTE — DIETITIAN INITIAL EVALUATION ADULT - MALNUTRITION
Pt meets criteria for severe protein-calorie malnutrition in the context of acute illness Pt meets criteria for severe protein-calorie malnutrition in the context of acute on chronic illness

## 2023-02-17 NOTE — H&P ADULT - NSHPPHYSICALEXAM_GEN_ALL_CORE
T(C): 37.4 (02-16-23 @ 21:42), Max: 37.4 (02-16-23 @ 21:42)  HR: 84 (02-16-23 @ 21:42) (84 - 92)  BP: 114/61 (02-16-23 @ 21:42) (112/54 - 122/56)  RR: 19 (02-16-23 @ 21:42) (18 - 19)  SpO2: 99% (02-16-23 @ 21:42) (95% - 99%)    CONSTITUTIONAL: Well groomed, no apparent distress  EYES: PERRLA and symmetric, EOMI, No conjunctival or scleral injection, ++icteric  ENMT: Oral mucosa with moist membranes. Normal dentition; no pharyngeal injection or exudates             NECK: Supple, symmetric and without tracheal deviation   RESP: No respiratory distress, no use of accessory muscles; CTA b/l, no WRR  CV: RRR, +S1S2, no MRG; no JVD; no peripheral edema  GI: Soft, +=Tenderness in the RUQ area with mild palptation, ND, ++rebound, ++ guarding;   LYMPH: No cervical LAD or tenderness; no axillary LAD or tenderness; no inguinal LAD or tenderness  MSK: Normal gait; No digital clubbing or cyanosis; examination of the (head/neck/spine/ribs/pelvis, RUE, LUE, RLE, LLE) without misalignment,            Normal ROM without pain, no spinal tenderness, normal muscle strength/tone  SKIN: No rashes or ulcers noted; no subcutaneous nodules or induration palpable  NEURO: CN II-XII intact; normal reflexes in upper and lower extremities, sensation intact in upper and lower extremities b/l to light touch   PSYCH: Appropriate insight/judgment; A+O x 3, mood and affect appropriate, recent/remote memory intact

## 2023-02-17 NOTE — DIETITIAN INITIAL EVALUATION ADULT - FACTORS AFF FOOD INTAKE
self-restricted diet/change in sense of smell or taste/difficulty chewing/difficulty with food procurement/preparation/persistent constipation/persistent lack of appetite/persistent nausea

## 2023-02-17 NOTE — H&P ADULT - ASSESSMENT
A/P:    1.  Abdominal pain  Jaundice  --CT abd-no acute finding  -may need MRCP/ERCP or EUS  -follow gastroenterology consult   -follow labs    2.  Bandemia  -due to Neupogen yesterday   -no s/s infection  Anemia  Lymphoma  -Hb-7.8  -follow Hb, if Hb <7, will transfuse  Low Platelet---no active bleeding  -follow Oncology consult for continuation of trail Medications    3.  SCD for DVT ppx    4.  Code status: Full code.

## 2023-02-17 NOTE — DIETITIAN INITIAL EVALUATION ADULT - OTHER INFO
68 year old Female with PMHx of Waldenstrom lymphoma dx 15 years ago on oral chemo trial Venetoclax presented to the ED with complain of abdominal pain, dark urine, yellowish stool. Patient also notes fever in the evening, nausea, yellowed eyes. Patient was in hospital in [place] 2 weeks ago for similar symptoms. But no specific reason for her symptoms were identified She was advised to come to hospital if her symptoms get worse. As per Abdominal pain is worse than before, she has come to the hospital again. Her Abdominal pain is very localized in RUQ area. Patient has h/o cholecystectomy.     Admit for jaundice, c/o abdominal pain and nausea x 2 weeks PTA.   Reports poor appetite and has not yet had a meal since admit last night. Currently on regular, low microbial diet 2/2 previous abn WBC levels. Pt c/o difficulty chewing tougher foods, recommend to continue regular, low microbial diet and add easy to chew consistency. Unable to obtain bed scale wt 2/2 pt sitting in upright position. Daily wt of 203# (stated) obtained on 2/17/23 by RN, appears accurate. Reports # x 1 mo ago. Wt loss of 27# / 11.7% x 1 mo (clinically significant). NFPE reveals varied muscle/fat wasting. C/o persistent nausea and is receiving Zofran prn - pt stated that it helped relieve nausea. GI consulted for c/o persistent abdominal pain, considering MRCP procedure. Add Ensure Max BID to increase calorie and protein intake - pt willing to trial. Pt c/o no BM x 2 days, continue to monitor bowel movements, if no BM for >3 days, consider implementing bowel regimen. See below for other recommendations.

## 2023-02-17 NOTE — DIETITIAN INITIAL EVALUATION ADULT - ORAL INTAKE PTA/DIET HISTORY
Lives at home w/ , daughter has been assisting w/ grocery and cooking more recently. Reports having a poor appetite and no desire to eat x a few weeks, pt unable to give diet recall 2/2 emotional state. Pt states that she tries to consume a diet high in leafy greens, beans and fish, and has recently tried to avoid the consumption of meat, white flour, and sugar. Pt likely consuming <50% ENN. RD advised pt to be cautious of restrictions if they are not necessary to increase PO intake. C/o taste changes, persistent nausea and abdominal pain affecting eating x last few weeks. Reports chronic constipation and takes Miralax daily.

## 2023-02-17 NOTE — CONSULT NOTE ADULT - ASSESSMENT
1. Cholestatic liver pattern, RUQ pain. No overt findings on CT. No medications that should cause DILI, however, Venclexta can rarely cause reactivation of HBV but liver test pattern not consistent.     Recommendation  1. Follow up MRI/MRCP  2. Check hepatitis serologies  3. Trend LFTs daily  4. Diet as tolerated 1. Cholestatic liver pattern, RUQ pain. No overt findings on CT. No medications that should cause DILI, however, Venclexta can rarely cause reactivation of HBV but liver test pattern not consistent.     Recommendation  1. Follow up MRI/MRCP  2. Check hepatitis serologies  3. Trend LFTs daily and check direct bilirubin  4. Diet as tolerated

## 2023-02-17 NOTE — DIETITIAN INITIAL EVALUATION ADULT - ETIOLOGY
r/t inability to meet increased nutrient needs 2/2 lymphoma and c/o persistent abdominal pain and nausea x 2 weeks PTA  r/t inability to meet increased nutrient needs 2/2 c/o persistent abdominal pain/nausea on lymphoma

## 2023-02-18 ENCOUNTER — TRANSCRIPTION ENCOUNTER (OUTPATIENT)
Age: 68
End: 2023-02-18

## 2023-02-18 LAB
ALBUMIN SERPL ELPH-MCNC: 2.2 G/DL — LOW (ref 3.3–5)
ALP SERPL-CCNC: 237 U/L — HIGH (ref 40–120)
ALT FLD-CCNC: 16 U/L — SIGNIFICANT CHANGE UP (ref 12–78)
AMYLASE P1 CFR SERPL: 16 U/L — LOW (ref 25–115)
ANION GAP SERPL CALC-SCNC: 4 MMOL/L — LOW (ref 5–17)
AST SERPL-CCNC: 23 U/L — SIGNIFICANT CHANGE UP (ref 15–37)
BILIRUB DIRECT SERPL-MCNC: 0.6 MG/DL — HIGH (ref 0–0.3)
BILIRUB INDIRECT FLD-MCNC: 1.1 MG/DL — HIGH (ref 0.2–1)
BILIRUB SERPL-MCNC: 1.7 MG/DL — HIGH (ref 0.2–1.2)
BILIRUB SERPL-MCNC: 1.7 MG/DL — HIGH (ref 0.2–1.2)
BUN SERPL-MCNC: 14 MG/DL — SIGNIFICANT CHANGE UP (ref 7–23)
CALCIUM SERPL-MCNC: 8.6 MG/DL — SIGNIFICANT CHANGE UP (ref 8.5–10.1)
CHLORIDE SERPL-SCNC: 106 MMOL/L — SIGNIFICANT CHANGE UP (ref 96–108)
CO2 SERPL-SCNC: 26 MMOL/L — SIGNIFICANT CHANGE UP (ref 22–31)
CREAT SERPL-MCNC: 0.69 MG/DL — SIGNIFICANT CHANGE UP (ref 0.5–1.3)
CRP SERPL-MCNC: 28 MG/L — HIGH
CULTURE RESULTS: SIGNIFICANT CHANGE UP
EGFR: 94 ML/MIN/1.73M2 — SIGNIFICANT CHANGE UP
FERRITIN SERPL-MCNC: 1156 NG/ML — HIGH (ref 15–150)
GLUCOSE SERPL-MCNC: 111 MG/DL — HIGH (ref 70–99)
HBV CORE AB SER-ACNC: REACTIVE
HBV CORE IGM SER-ACNC: SIGNIFICANT CHANGE UP
HBV E AB SER-ACNC: SIGNIFICANT CHANGE UP
HBV E AG SER-ACNC: SIGNIFICANT CHANGE UP
HBV SURFACE AB SER-ACNC: REACTIVE
HBV SURFACE AG SER-ACNC: SIGNIFICANT CHANGE UP
HCT VFR BLD CALC: 21.9 % — LOW (ref 34.5–45)
HGB BLD-MCNC: 7.1 G/DL — LOW (ref 11.5–15.5)
IRON SATN MFR SERPL: 167 UG/DL — HIGH (ref 30–160)
IRON SATN MFR SERPL: 75 % — HIGH (ref 14–50)
LIDOCAIN IGE QN: 120 U/L — SIGNIFICANT CHANGE UP (ref 73–393)
MCHC RBC-ENTMCNC: 32.4 GM/DL — SIGNIFICANT CHANGE UP (ref 32–36)
MCHC RBC-ENTMCNC: 37.2 PG — HIGH (ref 27–34)
MCV RBC AUTO: 114.7 FL — HIGH (ref 80–100)
NRBC # BLD: 3 /100 WBCS — HIGH (ref 0–0)
PLATELET # BLD AUTO: 69 K/UL — LOW (ref 150–400)
POTASSIUM SERPL-MCNC: 3.8 MMOL/L — SIGNIFICANT CHANGE UP (ref 3.5–5.3)
POTASSIUM SERPL-SCNC: 3.8 MMOL/L — SIGNIFICANT CHANGE UP (ref 3.5–5.3)
PROT SERPL-MCNC: 8.1 GM/DL — SIGNIFICANT CHANGE UP (ref 6–8.3)
RBC # BLD: 1.91 M/UL — LOW (ref 3.8–5.2)
RBC # FLD: 22.2 % — HIGH (ref 10.3–14.5)
SODIUM SERPL-SCNC: 136 MMOL/L — SIGNIFICANT CHANGE UP (ref 135–145)
SPECIMEN SOURCE: SIGNIFICANT CHANGE UP
TIBC SERPL-MCNC: 224 UG/DL — SIGNIFICANT CHANGE UP (ref 220–430)
UIBC SERPL-MCNC: 57 UG/DL — LOW (ref 110–370)
WBC # BLD: 5.57 K/UL — SIGNIFICANT CHANGE UP (ref 3.8–10.5)
WBC # FLD AUTO: 5.57 K/UL — SIGNIFICANT CHANGE UP (ref 3.8–10.5)

## 2023-02-18 PROCEDURE — 99232 SBSQ HOSP IP/OBS MODERATE 35: CPT

## 2023-02-18 PROCEDURE — 93306 TTE W/DOPPLER COMPLETE: CPT | Mod: 26

## 2023-02-18 RX ORDER — DULOXETINE HYDROCHLORIDE 30 MG/1
120 CAPSULE, DELAYED RELEASE ORAL DAILY
Refills: 0 | Status: DISCONTINUED | OUTPATIENT
Start: 2023-02-19 | End: 2023-02-22

## 2023-02-18 RX ORDER — DULOXETINE HYDROCHLORIDE 30 MG/1
90 CAPSULE, DELAYED RELEASE ORAL ONCE
Refills: 0 | Status: COMPLETED | OUTPATIENT
Start: 2023-02-18 | End: 2023-02-18

## 2023-02-18 RX ORDER — DIPHENHYDRAMINE HCL 50 MG
25 CAPSULE ORAL ONCE
Refills: 0 | Status: COMPLETED | OUTPATIENT
Start: 2023-02-18 | End: 2023-02-18

## 2023-02-18 RX ORDER — POLYETHYLENE GLYCOL 3350 17 G/17G
17 POWDER, FOR SOLUTION ORAL DAILY
Refills: 0 | Status: DISCONTINUED | OUTPATIENT
Start: 2023-02-18 | End: 2023-02-22

## 2023-02-18 RX ADMIN — DULOXETINE HYDROCHLORIDE 90 MILLIGRAM(S): 30 CAPSULE, DELAYED RELEASE ORAL at 06:02

## 2023-02-18 RX ADMIN — TRAMADOL HYDROCHLORIDE 25 MILLIGRAM(S): 50 TABLET ORAL at 14:04

## 2023-02-18 RX ADMIN — MONTELUKAST 10 MILLIGRAM(S): 4 TABLET, CHEWABLE ORAL at 09:49

## 2023-02-18 RX ADMIN — DULOXETINE HYDROCHLORIDE 30 MILLIGRAM(S): 30 CAPSULE, DELAYED RELEASE ORAL at 06:02

## 2023-02-18 RX ADMIN — Medication 25 MILLIGRAM(S): at 22:34

## 2023-02-18 RX ADMIN — POLYETHYLENE GLYCOL 3350 17 GRAM(S): 17 POWDER, FOR SOLUTION ORAL at 09:48

## 2023-02-18 RX ADMIN — TRAMADOL HYDROCHLORIDE 25 MILLIGRAM(S): 50 TABLET ORAL at 13:34

## 2023-02-18 RX ADMIN — Medication 1 SPRAY(S): at 09:48

## 2023-02-18 RX ADMIN — Medication 1 SPRAY(S): at 21:18

## 2023-02-18 RX ADMIN — PANTOPRAZOLE SODIUM 40 MILLIGRAM(S): 20 TABLET, DELAYED RELEASE ORAL at 06:02

## 2023-02-18 RX ADMIN — Medication 150 MICROGRAM(S): at 06:02

## 2023-02-19 LAB
ALBUMIN SERPL ELPH-MCNC: 2.4 G/DL — LOW (ref 3.3–5)
ALP SERPL-CCNC: 271 U/L — HIGH (ref 40–120)
ALT FLD-CCNC: 17 U/L — SIGNIFICANT CHANGE UP (ref 12–78)
ANION GAP SERPL CALC-SCNC: 2 MMOL/L — LOW (ref 5–17)
AST SERPL-CCNC: 22 U/L — SIGNIFICANT CHANGE UP (ref 15–37)
BILIRUB SERPL-MCNC: 1.5 MG/DL — HIGH (ref 0.2–1.2)
BUN SERPL-MCNC: 13 MG/DL — SIGNIFICANT CHANGE UP (ref 7–23)
CALCIUM SERPL-MCNC: 9.2 MG/DL — SIGNIFICANT CHANGE UP (ref 8.5–10.1)
CHLORIDE SERPL-SCNC: 108 MMOL/L — SIGNIFICANT CHANGE UP (ref 96–108)
CO2 SERPL-SCNC: 28 MMOL/L — SIGNIFICANT CHANGE UP (ref 22–31)
CREAT SERPL-MCNC: 0.67 MG/DL — SIGNIFICANT CHANGE UP (ref 0.5–1.3)
EGFR: 95 ML/MIN/1.73M2 — SIGNIFICANT CHANGE UP
GLUCOSE SERPL-MCNC: 120 MG/DL — HIGH (ref 70–99)
HCT VFR BLD CALC: 25.3 % — LOW (ref 34.5–45)
HGB BLD-MCNC: 8.2 G/DL — LOW (ref 11.5–15.5)
MCHC RBC-ENTMCNC: 32.4 GM/DL — SIGNIFICANT CHANGE UP (ref 32–36)
MCHC RBC-ENTMCNC: 38.1 PG — HIGH (ref 27–34)
MCV RBC AUTO: 117.7 FL — HIGH (ref 80–100)
NRBC # BLD: 3 /100 WBCS — HIGH (ref 0–0)
PLATELET # BLD AUTO: 75 K/UL — LOW (ref 150–400)
POTASSIUM SERPL-MCNC: 4.7 MMOL/L — SIGNIFICANT CHANGE UP (ref 3.5–5.3)
POTASSIUM SERPL-SCNC: 4.7 MMOL/L — SIGNIFICANT CHANGE UP (ref 3.5–5.3)
PROT SERPL-MCNC: 8.7 GM/DL — HIGH (ref 6–8.3)
RBC # BLD: 2.15 M/UL — LOW (ref 3.8–5.2)
RBC # FLD: 21.8 % — HIGH (ref 10.3–14.5)
SODIUM SERPL-SCNC: 138 MMOL/L — SIGNIFICANT CHANGE UP (ref 135–145)
WBC # BLD: 5.98 K/UL — SIGNIFICANT CHANGE UP (ref 3.8–10.5)
WBC # FLD AUTO: 5.98 K/UL — SIGNIFICANT CHANGE UP (ref 3.8–10.5)

## 2023-02-19 PROCEDURE — 99232 SBSQ HOSP IP/OBS MODERATE 35: CPT

## 2023-02-19 RX ORDER — DIPHENHYDRAMINE HCL 50 MG
25 CAPSULE ORAL ONCE
Refills: 0 | Status: COMPLETED | OUTPATIENT
Start: 2023-02-19 | End: 2023-02-19

## 2023-02-19 RX ADMIN — Medication 1 SPRAY(S): at 21:12

## 2023-02-19 RX ADMIN — DULOXETINE HYDROCHLORIDE 120 MILLIGRAM(S): 30 CAPSULE, DELAYED RELEASE ORAL at 21:12

## 2023-02-19 RX ADMIN — PANTOPRAZOLE SODIUM 40 MILLIGRAM(S): 20 TABLET, DELAYED RELEASE ORAL at 06:45

## 2023-02-19 RX ADMIN — Medication 150 MICROGRAM(S): at 06:45

## 2023-02-19 RX ADMIN — POLYETHYLENE GLYCOL 3350 17 GRAM(S): 17 POWDER, FOR SOLUTION ORAL at 07:01

## 2023-02-19 RX ADMIN — MONTELUKAST 10 MILLIGRAM(S): 4 TABLET, CHEWABLE ORAL at 10:24

## 2023-02-19 RX ADMIN — Medication 25 MILLIGRAM(S): at 21:12

## 2023-02-19 RX ADMIN — Medication 1 SPRAY(S): at 10:23

## 2023-02-19 NOTE — CONSULT NOTE ADULT - ASSESSMENT
This is a 67 yo female followed by [facility] by Dr. Carlin for WM  now presently on venetoclax now presenting with abdominal pain     Lymphoplasmacytic Lymphoma   - now on Venetoclax   - previously on ZANIBRUTINIB with good response lasting about 2 years   - initially treated with chemoimmunotherapy followed by CAR-T   - now with ongoing cytopenias likely sequalae for WM   - would recommend snending QUIGGS, sFLC, and Immunofixation   - also would send serum viscosity     LFT abnormalities   - discussed with GI   - presently pending possible Liver biopsy   - patient noted on MRCP possible hemachromatosis  - unfortunately MR did not render T2* score as an estimation of iron deposition   - no reported histoyr of transfusion dependence.

## 2023-02-19 NOTE — CONSULT NOTE ADULT - SUBJECTIVE AND OBJECTIVE BOX
HPI:  68 year old Female with PMHx of Waldenstrom lymphoma dx 15 years ago on oral chemo trial Venetoclax presented to the ED with complain of abdominal pain, dark urine, yellowish stool. Patient also notes fever in the evening, nausea, yellowed eyes. Denies vomiting. Patient was in hospital in [place] 2 weeks ago for 2 weeks for similar symptoms. But no specific reason for her symptoms were identified She was advised to come to hospital if her symptoms get worse. As per Abdominal pain is worse than before, she has come to the hospital again. Her Abdominal pain is very localized in RUQ area. Patient has h/o cholecystectomy. No other injuries or complaints. (17 Feb 2023 01:25)      PAST MEDICAL & SURGICAL HISTORY:  Lymphoma      No significant past surgical history          Allergies    Rituxan (Anaphylaxis)    Intolerances        MEDICATIONS  (STANDING):  allopurinol 300 milliGRAM(s) Oral daily  diphenhydrAMINE Injectable 25 milliGRAM(s) IV Push at bedtime  DULoxetine 120 milliGRAM(s) Oral daily  fluticasone propionate 50 MICROgram(s)/spray Nasal Spray 1 Spray(s) Both Nostrils two times a day  levothyroxine 150 MICROGram(s) Oral daily  montelukast 10 milliGRAM(s) Oral daily  pantoprazole    Tablet 40 milliGRAM(s) Oral before breakfast  sodium chloride 0.9%. 1000 milliLiter(s) (100 mL/Hr) IV Continuous <Continuous>    MEDICATIONS  (PRN):  acetaminophen     Tablet .. 650 milliGRAM(s) Oral every 6 hours PRN Temp greater or equal to 38C (100.4F), Mild Pain (1 - 3)  ALPRAZolam 0.5 milliGRAM(s) Oral three times a day PRN anxiety  aluminum hydroxide/magnesium hydroxide/simethicone Suspension 30 milliLiter(s) Oral every 4 hours PRN Dyspepsia  diphenhydrAMINE Injectable 25 milliGRAM(s) IV Push every 6 hours PRN Rash and/or Itching  melatonin 3 milliGRAM(s) Oral at bedtime PRN Insomnia  ondansetron Injectable 4 milliGRAM(s) IV Push every 8 hours PRN Nausea and/or Vomiting  polyethylene glycol 3350 17 Gram(s) Oral daily PRN Constipation  traMADol 25 milliGRAM(s) Oral every 4 hours PRN Moderate Pain (4 - 6)      FAMILY HISTORY:  No pertinent family history in first degree relatives        SOCIAL HISTORY: No EtOH, no tobacco    REVIEW OF SYSTEMS:    CONSTITUTIONAL: No weakness, fevers or chills  EYES/ENT: No visual changes;  No vertigo or throat pain   NECK: No pain or stiffness  RESPIRATORY: No cough, wheezing, hemoptysis; No shortness of breath  CARDIOVASCULAR: No chest pain or palpitations  GASTROINTESTINAL: No abdominal or epigastric pain. No nausea, vomiting, or hematemesis; No diarrhea or constipation. No melena or hematochezia.  GENITOURINARY: No dysuria, frequency or hematuria  NEUROLOGICAL: No numbness or weakness  SKIN: No itching, burning, rashes, or lesions   All other review of systems is negative unless indicated above.        T(F): 98.4 (02-19-23 @ 15:29), Max: 98.8 (02-19-23 @ 08:25)  HR: 78 (02-19-23 @ 15:29)  BP: 122/60 (02-19-23 @ 15:29)  RR: 18 (02-19-23 @ 15:29)  SpO2: 98% (02-19-23 @ 15:29)  Wt(kg): --    GENERAL: NAD, well-developed  HEAD:  Atraumatic, Normocephalic  EYES: EOMI, PERRLA, conjunctiva and sclera clear  NECK: Supple, No JVD  CHEST/LUNG: Clear to auscultation bilaterally; No wheeze  HEART: Regular rate and rhythm; No murmurs, rubs, or gallops  ABDOMEN: Soft, Nontender, Nondistended; Bowel sounds present  EXTREMITIES:  2+ Peripheral Pulses, No clubbing, cyanosis, or edema  NEUROLOGY: non-focal  SKIN: No rashes or lesions                          8.2    5.98  )-----------( 75       ( 19 Feb 2023 06:52 )             25.3       02-19    138  |  108  |  13  ----------------------------<  120<H>  4.7   |  28  |  0.67    Ca    9.2      19 Feb 2023 06:53    TPro  8.7<H>  /  Alb  2.4<L>  /  TBili  1.5<H>  /  DBili  x   /  AST  22  /  ALT  17  /  AlkPhos  271<H>  02-19              .Blood None  02-16 @ 19:06   No growth to date.  --  --      Clean Catch Clean Catch (Midstream)  02-16 @ 17:21   <10,000 CFU/mL Normal Urogenital Rafaela  --  --      
HPI:  68 F w/ hx of  Waldenstrom lymphoma dx 15 years ago on oral chemo trial Venetoclax, hx of cholecystectomy who presented with 3 days of nausea, RUQ pain, darkened urine and yellow colored stool. Patient was hospitalized in mid 1/2023 at [facility] for similar symptoms with negative work up. Symptoms abated after discharge, however, recurred without inciting event 3 days prior to admission. She has had intermittent RUQ pain over the past 1.5 years but with no overt etiology. Denies herbal supplements and only new medication is Venclaxta and Nupogen for pancytopenia.     In the ED, hemodynamically stable and afebrile. Liver tests with Tbili 2.0 and alkp 223 otherwise normal AST/ALT.     Follows with [facility] Dr. Rodarte but has not been seen recently.       PAST MEDICAL & SURGICAL HISTORY:  Lymphoma      No significant past surgical history          Home Medications:  allopurinol 300 mg oral tablet: 1 tab(s) orally once a day (16 Feb 2023 21:01)  ALPRAZolam 0.5 mg oral tablet: 1 tab(s) orally 3 times a day, As Needed (16 Feb 2023 21:01)  azelastine-fluticasone 137 mcg-50 mcg/inh nasal spray: 1 spray(s) nasal 2 times a day (16 Feb 2023 21:01)  DULoxetine 30 mg oral delayed release capsule: 4 cap(s) orally once a day (16 Feb 2023 21:01)  fluticasone 50 mcg/inh nasal spray: 1 spray(s) nasal once a day (16 Feb 2023 21:01)  levothyroxine 150 mcg (0.15 mg) oral tablet: 1 tab(s) orally once a day (16 Feb 2023 21:01)  montelukast 10 mg oral tablet: 1 tab(s) orally once a day (16 Feb 2023 21:01)  pantoprazole 40 mg oral delayed release tablet: 1 tab(s) orally once a day (16 Feb 2023 21:01)  Venclexta 100 mg oral tablet: 4 tab(s) orally once a day (at bedtime) (16 Feb 2023 21:01)      MEDICATIONS  (STANDING):  allopurinol 300 milliGRAM(s) Oral daily  DULoxetine 30 milliGRAM(s) Oral daily  fluticasone propionate 50 MICROgram(s)/spray Nasal Spray 1 Spray(s) Both Nostrils two times a day  levothyroxine 150 MICROGram(s) Oral daily  montelukast 10 milliGRAM(s) Oral daily  pantoprazole    Tablet 40 milliGRAM(s) Oral before breakfast  sodium chloride 0.9%. 1000 milliLiter(s) (100 mL/Hr) IV Continuous <Continuous>    MEDICATIONS  (PRN):  acetaminophen     Tablet .. 650 milliGRAM(s) Oral every 6 hours PRN Temp greater or equal to 38C (100.4F), Mild Pain (1 - 3)  ALPRAZolam 0.5 milliGRAM(s) Oral three times a day PRN anxiety  aluminum hydroxide/magnesium hydroxide/simethicone Suspension 30 milliLiter(s) Oral every 4 hours PRN Dyspepsia  melatonin 3 milliGRAM(s) Oral at bedtime PRN Insomnia  ondansetron Injectable 4 milliGRAM(s) IV Push every 8 hours PRN Nausea and/or Vomiting  traMADol 25 milliGRAM(s) Oral every 4 hours PRN Moderate Pain (4 - 6)      Allergies    Rituxan (Anaphylaxis)    Intolerances        SOCIAL HISTORY:    FAMILY HISTORY:  No pertinent family history in first degree relatives        ROS  As above  Otherwise unremarkable    Vital Signs Last 24 Hrs  T(C): 37 (17 Feb 2023 08:43), Max: 37.4 (16 Feb 2023 21:42)  T(F): 98.6 (17 Feb 2023 08:43), Max: 99.3 (16 Feb 2023 21:42)  HR: 80 (17 Feb 2023 08:43) (80 - 92)  BP: 116/54 (17 Feb 2023 08:43) (112/54 - 131/58)  BP(mean): 70 (16 Feb 2023 21:42) (67 - 71)  RR: 18 (17 Feb 2023 08:43) (18 - 19)  SpO2: 97% (17 Feb 2023 08:43) (93% - 99%)    Parameters below as of 17 Feb 2023 08:43  Patient On (Oxygen Delivery Method): room air        Constitutional: NAD, well-developed  Respiratory: CTAB  Cardiovascular: S1 and S2, RRR  Gastrointestinal: BS+, soft, TTP of RUQ  Extremities: No peripheral edema  Psychiatric: Normal mood, normal affect  Skin: No rashes    LABS:                        7.4    5.60  )-----------( 81       ( 17 Feb 2023 07:13 )             22.5     02-17    135  |  106  |  13  ----------------------------<  111<H>  3.9   |  24  |  0.64    Ca    8.9      17 Feb 2023 07:13    TPro  8.4<H>  /  Alb  2.4<L>  /  TBili  2.0<H>  /  DBili  x   /  AST  28  /  ALT  16  /  AlkPhos  223<H>  02-17    PT/INR - ( 16 Feb 2023 17:33 )   PT: 12.8 sec;   INR: 1.10 ratio         PTT - ( 16 Feb 2023 17:33 )  PTT:32.6 sec  LIVER FUNCTIONS - ( 17 Feb 2023 07:13 )  Alb: 2.4 g/dL / Pro: 8.4 gm/dL / ALK PHOS: 223 U/L / ALT: 16 U/L / AST: 28 U/L / GGT: x             RADIOLOGY & ADDITIONAL STUDIES:    ACC: [id number] EXAM:  CT ABDOMEN AND PELVIS IC   ORDERED BY: JEFF BOOTHE     PROCEDURE DATE:  02/16/2023          INTERPRETATION:  CLINICAL INFORMATION: Jaundice, right upper quadrant   pain. History of cholecystectomy.    COMPARISON: CT chest 3/31/14, CT abdomen/pelvis/18/quadrant.    CONTRAST/COMPLICATIONS:  IV Contrast: Omnipaque 350  90 cc administered   10 cc discarded  Oral Contrast: NONE  Complications: None reported at time of study completion    PROCEDURE:  CT of the Abdomen and Pelviswas performed.  Sagittal and coronal reformats were performed.    FINDINGS:  LOWER CHEST: Within normal limits.    LIVER: Steatosis. Liver is enlarged and measures 23.8 cm in length.  BILE DUCTS: Normal caliber.  GALLBLADDER: Cholecystectomy.  SPLEEN: Splenomegaly. Spleen measures 17.6 cm in length.  PANCREAS: Within normal limits.  ADRENALS: Within normal limits.  KIDNEYS/URETERS: No renal stones or hydronephrosis.    BLADDER: Minimally distended.  REPRODUCTIVE ORGANS: Uterus and adnexa within normal limits.    BOWEL: No bowel obstruction. Appendix is not visualized. No evidence of   inflammation in the pericecal region. Scattered diverticulosis without   evidence of acute diverticulitis.  PERITONEUM: No ascites.  VESSELS: Atherosclerotic changes.  RETROPERITONEUM/LYMPH NODES: No lymphadenopathy.  ABDOMINAL WALL: Postsurgical changes.  BONES: Degenerative changes.    IMPRESSION:  Hepatosplenomegaly. No focal intrahepatic mass.    Postcholecystectomy. No biliary ductal dilatation.        --- End of Report ---            SHERMAN TAYLOR MD; Attending Radiologist  This document has been electronically signed. Feb 16 2023  7:10PM

## 2023-02-20 LAB
ALBUMIN SERPL ELPH-MCNC: 2.5 G/DL — LOW (ref 3.3–5)
ALP SERPL-CCNC: 282 U/L — HIGH (ref 40–120)
ALT FLD-CCNC: 17 U/L — SIGNIFICANT CHANGE UP (ref 12–78)
ANION GAP SERPL CALC-SCNC: 3 MMOL/L — LOW (ref 5–17)
AST SERPL-CCNC: 29 U/L — SIGNIFICANT CHANGE UP (ref 15–37)
BILIRUB SERPL-MCNC: 1.3 MG/DL — HIGH (ref 0.2–1.2)
BUN SERPL-MCNC: 11 MG/DL — SIGNIFICANT CHANGE UP (ref 7–23)
CALCIUM SERPL-MCNC: 9.3 MG/DL — SIGNIFICANT CHANGE UP (ref 8.5–10.1)
CHLORIDE SERPL-SCNC: 106 MMOL/L — SIGNIFICANT CHANGE UP (ref 96–108)
CO2 SERPL-SCNC: 28 MMOL/L — SIGNIFICANT CHANGE UP (ref 22–31)
CREAT SERPL-MCNC: 0.6 MG/DL — SIGNIFICANT CHANGE UP (ref 0.5–1.3)
EGFR: 97 ML/MIN/1.73M2 — SIGNIFICANT CHANGE UP
GLUCOSE SERPL-MCNC: 122 MG/DL — HIGH (ref 70–99)
POTASSIUM SERPL-MCNC: 4.3 MMOL/L — SIGNIFICANT CHANGE UP (ref 3.5–5.3)
POTASSIUM SERPL-SCNC: 4.3 MMOL/L — SIGNIFICANT CHANGE UP (ref 3.5–5.3)
PROT SERPL-MCNC: 8.6 GM/DL — HIGH (ref 6–8.3)
SODIUM SERPL-SCNC: 137 MMOL/L — SIGNIFICANT CHANGE UP (ref 135–145)

## 2023-02-20 PROCEDURE — 99232 SBSQ HOSP IP/OBS MODERATE 35: CPT

## 2023-02-20 RX ORDER — DIPHENHYDRAMINE HCL 50 MG
25 CAPSULE ORAL ONCE
Refills: 0 | Status: COMPLETED | OUTPATIENT
Start: 2023-02-20 | End: 2023-02-20

## 2023-02-20 RX ADMIN — PANTOPRAZOLE SODIUM 40 MILLIGRAM(S): 20 TABLET, DELAYED RELEASE ORAL at 06:53

## 2023-02-20 RX ADMIN — TRAMADOL HYDROCHLORIDE 25 MILLIGRAM(S): 50 TABLET ORAL at 04:25

## 2023-02-20 RX ADMIN — Medication 1 SPRAY(S): at 20:46

## 2023-02-20 RX ADMIN — Medication 1 SPRAY(S): at 09:02

## 2023-02-20 RX ADMIN — POLYETHYLENE GLYCOL 3350 17 GRAM(S): 17 POWDER, FOR SOLUTION ORAL at 09:00

## 2023-02-20 RX ADMIN — TRAMADOL HYDROCHLORIDE 25 MILLIGRAM(S): 50 TABLET ORAL at 20:47

## 2023-02-20 RX ADMIN — MONTELUKAST 10 MILLIGRAM(S): 4 TABLET, CHEWABLE ORAL at 09:01

## 2023-02-20 RX ADMIN — Medication 0.5 MILLIGRAM(S): at 20:46

## 2023-02-20 RX ADMIN — Medication 30 MILLILITER(S): at 22:48

## 2023-02-20 RX ADMIN — DULOXETINE HYDROCHLORIDE 120 MILLIGRAM(S): 30 CAPSULE, DELAYED RELEASE ORAL at 20:49

## 2023-02-20 RX ADMIN — TRAMADOL HYDROCHLORIDE 25 MILLIGRAM(S): 50 TABLET ORAL at 20:52

## 2023-02-20 RX ADMIN — TRAMADOL HYDROCHLORIDE 25 MILLIGRAM(S): 50 TABLET ORAL at 04:06

## 2023-02-20 RX ADMIN — Medication 150 MICROGRAM(S): at 06:53

## 2023-02-20 RX ADMIN — Medication 25 MILLIGRAM(S): at 22:43

## 2023-02-21 ENCOUNTER — RESULT REVIEW (OUTPATIENT)
Age: 68
End: 2023-02-21

## 2023-02-21 LAB
HCT VFR BLD CALC: 24.3 % — LOW (ref 34.5–45)
HGB BLD-MCNC: 7.8 G/DL — LOW (ref 11.5–15.5)
IGA FLD-MCNC: 9 MG/DL — LOW (ref 84–499)
IGG FLD-MCNC: 1752 MG/DL — HIGH (ref 610–1660)
IGM SERPL-MCNC: 2896 MG/DL — HIGH (ref 35–242)
KAPPA LC SER QL IFE: 11.24 MG/DL — HIGH (ref 0.33–1.94)
KAPPA/LAMBDA FREE LIGHT CHAIN RATIO, SERUM: 40.14 RATIO — HIGH (ref 0.26–1.65)
LAMBDA LC SER QL IFE: 0.28 MG/DL — LOW (ref 0.57–2.63)
MCHC RBC-ENTMCNC: 32.1 GM/DL — SIGNIFICANT CHANGE UP (ref 32–36)
MCHC RBC-ENTMCNC: 37.9 PG — HIGH (ref 27–34)
MCV RBC AUTO: 118 FL — HIGH (ref 80–100)
NRBC # BLD: 2 /100 WBCS — HIGH (ref 0–0)
PLATELET # BLD AUTO: 52 K/UL — LOW (ref 150–400)
RBC # BLD: 2.06 M/UL — LOW (ref 3.8–5.2)
RBC # FLD: 20.5 % — HIGH (ref 10.3–14.5)
SARS-COV-2 RNA SPEC QL NAA+PROBE: SIGNIFICANT CHANGE UP
WBC # BLD: 3.9 K/UL — SIGNIFICANT CHANGE UP (ref 3.8–10.5)
WBC # FLD AUTO: 3.9 K/UL — SIGNIFICANT CHANGE UP (ref 3.8–10.5)

## 2023-02-21 PROCEDURE — 88313 SPECIAL STAINS GROUP 2: CPT | Mod: 26

## 2023-02-21 PROCEDURE — 88307 TISSUE EXAM BY PATHOLOGIST: CPT | Mod: 26

## 2023-02-21 PROCEDURE — 76942 ECHO GUIDE FOR BIOPSY: CPT | Mod: 26

## 2023-02-21 PROCEDURE — 47000 NEEDLE BIOPSY OF LIVER PERQ: CPT

## 2023-02-21 PROCEDURE — 99232 SBSQ HOSP IP/OBS MODERATE 35: CPT

## 2023-02-21 RX ORDER — ONDANSETRON 8 MG/1
4 TABLET, FILM COATED ORAL ONCE
Refills: 0 | Status: DISCONTINUED | OUTPATIENT
Start: 2023-02-21 | End: 2023-02-21

## 2023-02-21 RX ORDER — OXYCODONE HYDROCHLORIDE 5 MG/1
5 TABLET ORAL ONCE
Refills: 0 | Status: DISCONTINUED | OUTPATIENT
Start: 2023-02-21 | End: 2023-02-21

## 2023-02-21 RX ORDER — OXYCODONE HYDROCHLORIDE 5 MG/1
10 TABLET ORAL ONCE
Refills: 0 | Status: DISCONTINUED | OUTPATIENT
Start: 2023-02-21 | End: 2023-02-21

## 2023-02-21 RX ORDER — SODIUM CHLORIDE 9 MG/ML
1000 INJECTION, SOLUTION INTRAVENOUS
Refills: 0 | Status: DISCONTINUED | OUTPATIENT
Start: 2023-02-21 | End: 2023-02-21

## 2023-02-21 RX ORDER — FENTANYL CITRATE 50 UG/ML
50 INJECTION INTRAVENOUS
Refills: 0 | Status: DISCONTINUED | OUTPATIENT
Start: 2023-02-21 | End: 2023-02-21

## 2023-02-21 RX ADMIN — POLYETHYLENE GLYCOL 3350 17 GRAM(S): 17 POWDER, FOR SOLUTION ORAL at 21:38

## 2023-02-21 RX ADMIN — MONTELUKAST 10 MILLIGRAM(S): 4 TABLET, CHEWABLE ORAL at 09:52

## 2023-02-21 RX ADMIN — PANTOPRAZOLE SODIUM 40 MILLIGRAM(S): 20 TABLET, DELAYED RELEASE ORAL at 05:37

## 2023-02-21 RX ADMIN — Medication 1 SPRAY(S): at 09:51

## 2023-02-21 RX ADMIN — FENTANYL CITRATE 50 MICROGRAM(S): 50 INJECTION INTRAVENOUS at 16:08

## 2023-02-21 RX ADMIN — DULOXETINE HYDROCHLORIDE 120 MILLIGRAM(S): 30 CAPSULE, DELAYED RELEASE ORAL at 21:38

## 2023-02-21 RX ADMIN — FENTANYL CITRATE 50 MICROGRAM(S): 50 INJECTION INTRAVENOUS at 16:38

## 2023-02-21 RX ADMIN — Medication 1 SPRAY(S): at 21:38

## 2023-02-21 RX ADMIN — Medication 150 MICROGRAM(S): at 05:37

## 2023-02-21 NOTE — PROGRESS NOTE ADULT - NUTRITIONAL ASSESSMENT
This patient has been assessed with a concern for Malnutrition and has been determined to have a diagnosis/diagnoses of Severe protein-calorie malnutrition.    This patient is being managed with:   Diet Low Microbial-  Entered: Feb 16 2023  7:29PM    Diet Regular-  Entered: Feb 16 2023  7:28PM    

## 2023-02-22 ENCOUNTER — TRANSCRIPTION ENCOUNTER (OUTPATIENT)
Age: 68
End: 2023-02-22

## 2023-02-22 VITALS
SYSTOLIC BLOOD PRESSURE: 131 MMHG | TEMPERATURE: 98 F | HEART RATE: 88 BPM | OXYGEN SATURATION: 99 % | RESPIRATION RATE: 16 BRPM | DIASTOLIC BLOOD PRESSURE: 58 MMHG

## 2023-02-22 LAB
CULTURE RESULTS: SIGNIFICANT CHANGE UP
CULTURE RESULTS: SIGNIFICANT CHANGE UP
HCT VFR BLD CALC: 26 % — LOW (ref 34.5–45)
HGB BLD-MCNC: 8.3 G/DL — LOW (ref 11.5–15.5)
MCHC RBC-ENTMCNC: 31.9 GM/DL — LOW (ref 32–36)
MCHC RBC-ENTMCNC: 38.2 PG — HIGH (ref 27–34)
MCV RBC AUTO: 119.8 FL — HIGH (ref 80–100)
NRBC # BLD: 2 /100 WBCS — HIGH (ref 0–0)
PLATELET # BLD AUTO: 51 K/UL — LOW (ref 150–400)
PROT SERPL-MCNC: 8.2 G/DL — SIGNIFICANT CHANGE UP (ref 6–8.3)
PROT SERPL-MCNC: 8.2 G/DL — SIGNIFICANT CHANGE UP (ref 6–8.3)
RBC # BLD: 2.17 M/UL — LOW (ref 3.8–5.2)
RBC # FLD: 19.6 % — HIGH (ref 10.3–14.5)
SPECIMEN SOURCE: SIGNIFICANT CHANGE UP
SPECIMEN SOURCE: SIGNIFICANT CHANGE UP
WBC # BLD: 3.68 K/UL — LOW (ref 3.8–10.5)
WBC # FLD AUTO: 3.68 K/UL — LOW (ref 3.8–10.5)

## 2023-02-22 PROCEDURE — 99239 HOSP IP/OBS DSCHRG MGMT >30: CPT

## 2023-02-22 RX ADMIN — PANTOPRAZOLE SODIUM 40 MILLIGRAM(S): 20 TABLET, DELAYED RELEASE ORAL at 05:28

## 2023-02-22 RX ADMIN — Medication 1 SPRAY(S): at 10:56

## 2023-02-22 RX ADMIN — Medication 150 MICROGRAM(S): at 05:29

## 2023-02-22 RX ADMIN — MONTELUKAST 10 MILLIGRAM(S): 4 TABLET, CHEWABLE ORAL at 10:57

## 2023-02-22 NOTE — PROGRESS NOTE ADULT - SUBJECTIVE AND OBJECTIVE BOX
[place] complaints and Diagnosis: jaundice, RUQ pain    Subjective:     patient currently denies any abdominal pain   no fevers   seen by the JACINDA and family wanted second opinion    has constipation and was given miralax yesterday   02/20/23: Patient seen and examined. No new issues. Discussed with patient and daughter at bed side regarding management plan.   02/21/23: Seen in am, waiting for liver biopsy      REVIEW OF SYSTEMS:    CONSTITUTIONAL: No weakness,   EYES/ENT: No visual changes;  No vertigo or throat pain   NECK: No pain or stiffness  RESPIRATORY: No cough, wheezing, hemoptysis; No shortness of breath  CARDIOVASCULAR: No chest pain or palpitations  GASTROINTESTINAL: No abdominal or epigastric pain or nausea or vomiting and has constipation   GENITOURINARY: No dysuria, frequency or hematuria  NEUROLOGICAL: No numbness or weakness  SKIN: No itching, burning, rashes, or lesions   All other review of systems is negative unless indicated above      Vital Signs Last 24 Hrs  T(C): 37.2 (21 Feb 2023 08:25), Max: 37.2 (21 Feb 2023 08:25)  T(F): 99 (21 Feb 2023 08:25), Max: 99 (21 Feb 2023 08:25)  HR: 79 (21 Feb 2023 08:25) (72 - 87)  BP: 134/59 (21 Feb 2023 08:25) (122/72 - 134/59)  BP(mean): 74 (21 Feb 2023 08:25) (74 - 81)  RR: 17 (21 Feb 2023 08:25) (17 - 18)  SpO2: 97% (21 Feb 2023 08:25) (94% - 97%)    Parameters below as of 21 Feb 2023 08:25  Patient On (Oxygen Delivery Method): room air      Physical Exam:       HEENT:   pupils equal and reactive, EOMI,  NECK:   supple, no carotid bruits, no palpable lymph nodes, no thyromegaly  CV:  +S1, +S2, regular, no murmurs or rubs  RESP:   lungs clear to auscultation bilaterally, no wheezing, rales, rhonchi, good air entry bilaterally  GI:  abdomen soft, non-tender, non-distended, normal BS, no bruits, no abdominal masses, no palpable masses  MSK:   normal muscle tone, no atrophy, no rigidity, no contractions  EXT:  left leg more swollen than right leg with no calf tenderness and has mild pitting edema which is chronic as per the patient  VASCULAR:  pulses equal and symmetric in the upper and lower extremities  NEURO:  AAOX3, no focal neurological deficits, follows all commands, able to move extremities spontaneously      MEDICATIONS  (STANDING):  allopurinol 300 milliGRAM(s) Oral daily  diphenhydrAMINE Injectable 25 milliGRAM(s) IV Push at bedtime  DULoxetine 120 milliGRAM(s) Oral daily  fluticasone propionate 50 MICROgram(s)/spray Nasal Spray 1 Spray(s) Both Nostrils two times a day  levothyroxine 150 MICROGram(s) Oral daily  montelukast 10 milliGRAM(s) Oral daily  pantoprazole    Tablet 40 milliGRAM(s) Oral before breakfast  sodium chloride 0.9%. 1000 milliLiter(s) (100 mL/Hr) IV Continuous <Continuous>    MEDICATIONS  (PRN):  acetaminophen     Tablet .. 650 milliGRAM(s) Oral every 6 hours PRN Temp greater or equal to 38C (100.4F), Mild Pain (1 - 3)  ALPRAZolam 0.5 milliGRAM(s) Oral three times a day PRN anxiety  aluminum hydroxide/magnesium hydroxide/simethicone Suspension 30 milliLiter(s) Oral every 4 hours PRN Dyspepsia  diphenhydrAMINE Injectable 25 milliGRAM(s) IV Push every 6 hours PRN Rash and/or Itching  melatonin 3 milliGRAM(s) Oral at bedtime PRN Insomnia  ondansetron Injectable 4 milliGRAM(s) IV Push every 8 hours PRN Nausea and/or Vomiting  polyethylene glycol 3350 17 Gram(s) Oral daily PRN Constipation  traMADol 25 milliGRAM(s) Oral every 4 hours PRN Moderate Pain (4 - 6)                          MEDICATIONS  (STANDING):  allopurinol 300 milliGRAM(s) Oral daily  diphenhydrAMINE Injectable 25 milliGRAM(s) IV Push at bedtime  DULoxetine 120 milliGRAM(s) Oral daily  fluticasone propionate 50 MICROgram(s)/spray Nasal Spray 1 Spray(s) Both Nostrils two times a day  levothyroxine 150 MICROGram(s) Oral daily  montelukast 10 milliGRAM(s) Oral daily  pantoprazole    Tablet 40 milliGRAM(s) Oral before breakfast  sodium chloride 0.9%. 1000 milliLiter(s) (100 mL/Hr) IV Continuous <Continuous>    MEDICATIONS  (PRN):  acetaminophen     Tablet .. 650 milliGRAM(s) Oral every 6 hours PRN Temp greater or equal to 38C (100.4F), Mild Pain (1 - 3)  ALPRAZolam 0.5 milliGRAM(s) Oral three times a day PRN anxiety  aluminum hydroxide/magnesium hydroxide/simethicone Suspension 30 milliLiter(s) Oral every 4 hours PRN Dyspepsia  diphenhydrAMINE Injectable 25 milliGRAM(s) IV Push every 6 hours PRN Rash and/or Itching  melatonin 3 milliGRAM(s) Oral at bedtime PRN Insomnia  ondansetron Injectable 4 milliGRAM(s) IV Push every 8 hours PRN Nausea and/or Vomiting  polyethylene glycol 3350 17 Gram(s) Oral daily PRN Constipation  traMADol 25 milliGRAM(s) Oral every 4 hours PRN Moderate Pain (4 - 6)                                 7.8    3.90  )-----------( 52       ( 21 Feb 2023 07:11 )             24.3     20 Feb 2023 08:15    137    |  106    |  11     ----------------------------<  122    4.3     |  28     |  0.60     Ca    9.3        20 Feb 2023 08:15    TPro  8.6    /  Alb  2.5    /  TBili  1.3    /  DBili  x      /  AST  29     /  ALT  17     /  AlkPhos  282    20 Feb 2023 08:15    LIVER FUNCTIONS - ( 20 Feb 2023 08:15 )  Alb: 2.5 g/dL / Pro: 8.6 gm/dL / ALK PHOS: 282 U/L / ALT: 17 U/L / AST: 29 U/L / GGT: x             CAPILLARY BLOOD GLUCOSE            Assessment and Plan:        68 year old Female with PMHx of Waldenstrom lymphoma dx 15 years ago on oral chemo trial Venetoclax presented to the ED with complain of abdominal pain, dark urine, yellowish stool. Patient also notes fever in the evening, nausea, yellowed eyes. Denies vomiting. Patient was in hospital in [place] 2 weeks ago for 2 weeks for similar symptoms. But no specific reason for her symptoms were identified She was advised to come to hospital if her symptoms get worse. As per Abdominal pain is worse than before, she has come to the hospital again. Her Abdominal pain is very localized in RUQ area. Patient has h/o cholecystectomy. No other injuries or complaints.      1.  Abdominal pain  Jaundice  --CT abd-no acute finding  -MRCP results noted and concern for the hemochromatosis with the hepatospenomegaly  -GI consult appreciated and suggested to follow up with the hemochromatosis gene   - has high ferritin levels and high transferrin  levels    - bilirubin  stable and MRCP shows no obstruction and shows gradual tapering of CBD   - patient family wanted second G.I opinion regarding findings on the MRCP   She was seen by Dr Jiang  IR consulted for liver biopsy    2.  anemia and thrombocytopenia with the stable Hb of 8.2 and platelet of 75   could be related with the immunotherapy drug venataclox or with the underlying lymphoma   she does have high LDH and low hematoglobulin levels with the possibility of hemolysis   would wait for the input from the hematology regarding the anemia and indication for the prbc and work up thrombocytopenia     3.  Hypothyroidism  continue with her home dose of synthroid    4 anxiety   continue cymbalta her home dose 120  mg po daily      Dvt prophylaxis with the compression stockings   Code status: Full code.                 
    [place] complaints and Diagnosis: jaundice, RUQ pain    Subjective:     patient currently denies any abdominal pain   no fevers   seen by the G.I   says takes synthyroid   has mild left leg swelling which is chronic       REVIEW OF SYSTEMS:    CONSTITUTIONAL: No weakness,   EYES/ENT: No visual changes;  No vertigo or throat pain   NECK: No pain or stiffness  RESPIRATORY: No cough, wheezing, hemoptysis; No shortness of breath  CARDIOVASCULAR: No chest pain or palpitations  GASTROINTESTINAL: No abdominal or epigastric pain. and right upper quadrant pain improves   GENITOURINARY: No dysuria, frequency or hematuria  NEUROLOGICAL: No numbness or weakness  SKIN: No itching, burning, rashes, or lesions   All other review of systems is negative unless indicated above    Vital Signs Last 24 Hrs  T(C): 37.1 (18 Feb 2023 08:12), Max: 37.1 (17 Feb 2023 15:31)  T(F): 98.8 (18 Feb 2023 08:12), Max: 98.8 (17 Feb 2023 15:31)  HR: 84 (18 Feb 2023 08:12) (78 - 84)  BP: 120/59 (18 Feb 2023 08:12) (120/59 - 136/63)  BP(mean): 80 (18 Feb 2023 05:04) (80 - 80)  RR: 18 (18 Feb 2023 08:12) (18 - 18)  SpO2: 98% (18 Feb 2023 08:12) (96% - 98%)    Parameters below as of 18 Feb 2023 08:12  Patient On (Oxygen Delivery Method): room air          HEENT:   pupils equal and reactive, EOMI,    NECK:   supple, no carotid bruits, no palpable lymph nodes, no thyromegaly    CV:  +S1, +S2, regular, no murmurs or rubs    RESP:   lungs clear to auscultation bilaterally, no wheezing, rales, rhonchi, good air entry bilaterally    BREAST:  not examined    GI:  abdomen soft, non-tender, non-distended, normal BS, no bruits, no abdominal masses, no palpable masses    RECTAL:  not examined    :  not examined    MSK:   normal muscle tone, no atrophy, no rigidity, no contractions    EXT:  left leg more swollen than right leg with no calf tenderness and has mild pitting edema     VASCULAR:  pulses equal and symmetric in the upper and lower extremities    NEURO:  AAOX3, no focal neurological deficits, follows all commands, able to move extremities spontaneously    SKIN:  no ulcers, lesions or rashes; mild jaundice of the skin noted; there is no scleral icterus noted.       MEDICATIONS  (STANDING):  allopurinol 300 milliGRAM(s) Oral daily  diphenhydrAMINE Injectable 25 milliGRAM(s) IV Push at bedtime  DULoxetine 30 milliGRAM(s) Oral daily  fluticasone propionate 50 MICROgram(s)/spray Nasal Spray 1 Spray(s) Both Nostrils two times a day  levothyroxine 150 MICROGram(s) Oral daily  montelukast 10 milliGRAM(s) Oral daily  pantoprazole    Tablet 40 milliGRAM(s) Oral before breakfast  sodium chloride 0.9%. 1000 milliLiter(s) (100 mL/Hr) IV Continuous <Continuous>    MEDICATIONS  (PRN):  acetaminophen     Tablet .. 650 milliGRAM(s) Oral every 6 hours PRN Temp greater or equal to 38C (100.4F), Mild Pain (1 - 3)  ALPRAZolam 0.5 milliGRAM(s) Oral three times a day PRN anxiety  aluminum hydroxide/magnesium hydroxide/simethicone Suspension 30 milliLiter(s) Oral every 4 hours PRN Dyspepsia  melatonin 3 milliGRAM(s) Oral at bedtime PRN Insomnia  ondansetron Injectable 4 milliGRAM(s) IV Push every 8 hours PRN Nausea and/or Vomiting  traMADol 25 milliGRAM(s) Oral every 4 hours PRN Moderate Pain (4 - 6)                          7.1    5.57  )-----------( 69       ( 18 Feb 2023 07:15 )             21.9   02-18    136  |  106  |  14  ----------------------------<  111<H>  3.8   |  26  |  0.69    Ca    8.6      18 Feb 2023 07:15    TPro  8.1  /  Alb  2.2<L>  /  TBili  1.7<H>  /  DBili  0.6<H>  /  AST  23  /  ALT  16  /  AlkPhos  237<H>  02-18    < from: MR MRCP No Cont (02.17.23 @ 18:06) >    IMPRESSION:  Hepatosplenomegaly. Diffuse signal attention of the liver and spleen on   in-phase T1-weighted gradient echo images may represent hemachromatosis.   Clinical correlation with serum ferritin level is recommended.    ct abdomen and pelvis   FINDINGS:  LOWER CHEST: Within normal limits.    LIVER: Steatosis. Liver is enlarged and measures 23.8 cm in length.  BILE DUCTS: Normal caliber.  GALLBLADDER: Cholecystectomy.  SPLEEN: Splenomegaly. Spleen measures 17.6 cm in length.  PANCREAS: Within normal limits.  ADRENALS: Within normal limits.  KIDNEYS/URETERS: No renal stones or hydronephrosis.    BLADDER: Minimally distended.  REPRODUCTIVE ORGANS: Uterus and adnexa within normal limits.    BOWEL: No bowel obstruction. Appendix is not visualized. No evidence of   inflammation in the pericecal region. Scattered diverticulosis without   evidence of acute diverticulitis.  PERITONEUM: No ascites.  VESSELS: Atherosclerotic changes.  RETROPERITONEUM/LYMPH NODES: No lymphadenopathy.  ABDOMINAL WALL: Postsurgical changes.  BONES: Degenerative changes.    IMPRESSION:  Hepatosplenomegaly. No focal intrahepatic mass.    Postcholecystectomy. No biliary ductal dilatation.                              Assessment and Plan:        68 year old Female with PMHx of Waldenstrom lymphoma dx 15 years ago on oral chemo trial Venetoclax presented to the ED with complain of abdominal pain, dark urine, yellowish stool. Patient also notes fever in the evening, nausea, yellowed eyes. Denies vomiting. Patient was in hospital in [place] 2 weeks ago for 2 weeks for similar symptoms. But no specific reason for her symptoms were identified She was advised to come to hospital if her symptoms get worse. As per Abdominal pain is worse than before, she has come to the hospital again. Her Abdominal pain is very localized in RUQ area. Patient has h/o cholecystectomy. No other injuries or complaints.      1.  Abdominal pain  Jaundice  --CT abd-no acute finding  -MRCP results noted and concern for the hemochromatosis with the splenomegaly   -GI consult appreciated and suggested to follow up with the hemochromatosis gene   - has high ferritin levels and high transferrin  levels    - bilirubin  stable and MRCP shows no obstruction       2.    anemia and thrombocytopenia with the hb of 7.1 and platelets of 69 today   could be related with the immunotherapy drug venataclox or with the underlying lymphoma   she does have high LDH and low hematoglobulin levels with the possibility of hemolysis   would wait for the input from the hematology regarding the anemia and indication for the prbc and work up thrombocytopenia     3.  Hypothyroidism    continue with her home dose of synthroid    4 anxiety   continue cymbalta her home dose 120  mg po daily      Dvt prophylaxis   Code status: Full code.     dvt prophylaxis with the compression stocking   she does have chronic left leg swelling which was scanned recently and as per the patient is negative for the dvt             
    [facility] complaints and Diagnosis: jaundice, RUQ pain    Subjective: c/o Ruq pain, says the yellow discoloration in her eys is improved; skin is still yellow.       REVIEW OF SYSTEMS:    CONSTITUTIONAL: No weakness, fevers or chills  EYES/ENT: No visual changes;  No vertigo or throat pain   NECK: No pain or stiffness  RESPIRATORY: No cough, wheezing, hemoptysis; No shortness of breath  CARDIOVASCULAR: No chest pain or palpitations  GASTROINTESTINAL: No abdominal or epigastric pain. No nausea, vomiting, or hematemesis; No diarrhea or constipation. No melena or hematochezia.  GENITOURINARY: No dysuria, frequency or hematuria  NEUROLOGICAL: No numbness or weakness  SKIN: No itching, burning, rashes, or lesions   All other review of systems is negative unless indicated above      Vital Signs Last 24 Hrs  T(C): 37 (2023 08:43), Max: 37.4 (2023 21:42)  T(F): 98.6 (2023 08:43), Max: 99.3 (2023 21:42)  HR: 80 (2023 08:43) (80 - 92)  BP: 116/54 (2023 08:43) (112/54 - 131/58)  BP(mean): 70 (2023 21:42) (67 - 71)  RR: 18 (2023 08:43) (18 - 19)  SpO2: 97% (2023 08:43) (93% - 99%)    Parameters below as of 2023 08:43  Patient On (Oxygen Delivery Method): room air        HEENT:   pupils equal and reactive, EOMI, no oropharyngeal lesions, erythema, exudates, oral thrush;     NECK:   supple, no carotid bruits, no palpable lymph nodes, no thyromegaly    CV:  +S1, +S2, regular, no murmurs or rubs    RESP:   lungs clear to auscultation bilaterally, no wheezing, rales, rhonchi, good air entry bilaterally    BREAST:  not examined    GI:  abdomen soft, non-tender, non-distended, normal BS, no bruits, no abdominal masses, no palpable masses    RECTAL:  not examined    :  not examined    MSK:   normal muscle tone, no atrophy, no rigidity, no contractions    EXT:   no clubbing, no cyanosis, no edema, no calf pain, swelling or erythema    VASCULAR:  pulses equal and symmetric in the upper and lower extremities    NEURO:  AAOX3, no focal neurological deficits, follows all commands, able to move extremities spontaneously    SKIN:  no ulcers, lesions or rashes; mild jaundice of the skin noted; there is no scleral icterus noted.     MEDICATIONS  (STANDING):  allopurinol 300 milliGRAM(s) Oral daily  DULoxetine 30 milliGRAM(s) Oral daily  fluticasone propionate 50 MICROgram(s)/spray Nasal Spray 1 Spray(s) Both Nostrils two times a day  levothyroxine 150 MICROGram(s) Oral daily  montelukast 10 milliGRAM(s) Oral daily  pantoprazole    Tablet 40 milliGRAM(s) Oral before breakfast  sodium chloride 0.9%. 1000 milliLiter(s) (100 mL/Hr) IV Continuous <Continuous>    MEDICATIONS  (PRN):  acetaminophen     Tablet .. 650 milliGRAM(s) Oral every 6 hours PRN Temp greater or equal to 38C (100.4F), Mild Pain (1 - 3)  ALPRAZolam 0.5 milliGRAM(s) Oral three times a day PRN anxiety  aluminum hydroxide/magnesium hydroxide/simethicone Suspension 30 milliLiter(s) Oral every 4 hours PRN Dyspepsia  melatonin 3 milliGRAM(s) Oral at bedtime PRN Insomnia  ondansetron Injectable 4 milliGRAM(s) IV Push every 8 hours PRN Nausea and/or Vomiting  traMADol 25 milliGRAM(s) Oral every 4 hours PRN Moderate Pain (4 - 6)      Urinalysis Basic - ( 2023 17:21 )    Color: Yellow / Appearance: Clear / S.015 / pH: x  Gluc: x / Ketone: Negative  / Bili: Negative / Urobili: 1   Blood: x / Protein: Negative / Nitrite: Negative   Leuk Esterase: Negative / RBC: x / WBC x   Sq Epi: x / Non Sq Epi: x / Bacteria: x    2023 07:13    135    |  106    |  13     ----------------------------<  111    3.9     |  24     |  0.64     Ca    8.9        2023 07:13    TPro  8.4    /  Alb  2.4    /  TBili  2.0    /  DBili  x      /  AST  28     /  ALT  16     /  AlkPhos  223    2023 07:13  LIVER FUNCTIONS - ( 2023 07:13 )  Alb: 2.4 g/dL / Pro: 8.4 gm/dL / ALK PHOS: 223 U/L / ALT: 16 U/L / AST: 28 U/L / GGT: x         PT/INR - ( 2023 17:33 )   PT: 12.8 sec;   INR: 1.10 ratio         PTT - ( 2023 17:33 )  PTT:32.6 secCBC Full  -  ( 2023 07:13 )  WBC Count : 5.60 K/uL  Hemoglobin : 7.4 g/dL  Hematocrit : 22.5 %  Platelet Count - Automated : 81 K/uL  Mean Cell Volume : 114.8 fl  Mean Cell Hemoglobin : 37.8 pg  Mean Cell Hemoglobin Concentration : 32.9 gm/dL  Auto Neutrophil # : x  Auto Lymphocyte # : x  Auto Monocyte # : x  Auto Eosinophil # : x  Auto Basophil # : x  Auto Neutrophil % : x  Auto Lymphocyte % : x  Auto Monocyte % : x  Auto Eosinophil % : x  Auto Basophil % : x        Blood, Urine: Negative ( @ 17:21)      PT/INR - ( 2023 17:33 )   PT: 12.8 sec;   INR: 1.10 ratio         PTT - ( 2023 17:33 )  PTT:32.6 sec    RADIOLOGY RESULTS:          Assessment and Plan:        68 year old Female with PMHx of Waldenstrom lymphoma dx 15 years ago on oral chemo trial Venetoclax presented to the ED with complain of abdominal pain, dark urine, yellowish stool. Patient also notes fever in the evening, nausea, yellowed eyes. Denies vomiting. Patient was in hospital in [place] 2 weeks ago for 2 weeks for similar symptoms. But no specific reason for her symptoms were identified She was advised to come to hospital if her symptoms get worse. As per Abdominal pain is worse than before, she has come to the hospital again. Her Abdominal pain is very localized in RUQ area. Patient has h/o cholecystectomy. No other injuries or complaints.      1.  Abdominal pain  Jaundice  --CT abd-no acute finding  -MRCP ordered  -GI consult appreciated    2.  Bandemia  -due to Neupogen yesterday   -no s/s infection  Anemia  Lymphoma  -Hb-7.8  -follow Hb, if Hb <7, will transfuse  Low Platelet---no active bleeding  -follow Oncology consult for continuation of trail Medications    3.  SCD for DVT ppx    4.  Code status: Full code.             
    [facility] complaints and Diagnosis: jaundice, RUQ pain    Subjective:     patient currently denies any abdominal pain   no fevers   seen by the JACINDA and family wanted second opinion    has constipation and was given miralax yesterday   02/20/23: Patient seen and examined. No new issues. Discussed with patient and daughter at bed side regarding management plan.       REVIEW OF SYSTEMS:    CONSTITUTIONAL: No weakness,   EYES/ENT: No visual changes;  No vertigo or throat pain   NECK: No pain or stiffness  RESPIRATORY: No cough, wheezing, hemoptysis; No shortness of breath  CARDIOVASCULAR: No chest pain or palpitations  GASTROINTESTINAL: No abdominal or epigastric pain or nausea or vomiting and has constipation   GENITOURINARY: No dysuria, frequency or hematuria  NEUROLOGICAL: No numbness or weakness  SKIN: No itching, burning, rashes, or lesions   All other review of systems is negative unless indicated above      Vital Signs Last 24 Hrs  T(C): 37 (20 Feb 2023 07:53), Max: 37 (20 Feb 2023 07:53)  T(F): 98.6 (20 Feb 2023 07:53), Max: 98.6 (20 Feb 2023 07:53)  HR: 86 (20 Feb 2023 07:53) (78 - 86)  BP: 126/71 (20 Feb 2023 07:53) (122/60 - 126/71)  BP(mean): --  RR: 18 (20 Feb 2023 07:53) (18 - 18)  SpO2: 95% (20 Feb 2023 07:53) (95% - 98%)    Parameters below as of 20 Feb 2023 07:53  Patient On (Oxygen Delivery Method): room air        Physical Exam:       HEENT:   pupils equal and reactive, EOMI,  NECK:   supple, no carotid bruits, no palpable lymph nodes, no thyromegaly  CV:  +S1, +S2, regular, no murmurs or rubs  RESP:   lungs clear to auscultation bilaterally, no wheezing, rales, rhonchi, good air entry bilaterally  GI:  abdomen soft, non-tender, non-distended, normal BS, no bruits, no abdominal masses, no palpable masses  MSK:   normal muscle tone, no atrophy, no rigidity, no contractions  EXT:  left leg more swollen than right leg with no calf tenderness and has mild pitting edema which is chronic as per the patient  VASCULAR:  pulses equal and symmetric in the upper and lower extremities  NEURO:  AAOX3, no focal neurological deficits, follows all commands, able to move extremities spontaneously      MEDICATIONS  (STANDING):  allopurinol 300 milliGRAM(s) Oral daily  diphenhydrAMINE Injectable 25 milliGRAM(s) IV Push at bedtime  DULoxetine 120 milliGRAM(s) Oral daily  fluticasone propionate 50 MICROgram(s)/spray Nasal Spray 1 Spray(s) Both Nostrils two times a day  levothyroxine 150 MICROGram(s) Oral daily  montelukast 10 milliGRAM(s) Oral daily  pantoprazole    Tablet 40 milliGRAM(s) Oral before breakfast  sodium chloride 0.9%. 1000 milliLiter(s) (100 mL/Hr) IV Continuous <Continuous>    MEDICATIONS  (PRN):  acetaminophen     Tablet .. 650 milliGRAM(s) Oral every 6 hours PRN Temp greater or equal to 38C (100.4F), Mild Pain (1 - 3)  ALPRAZolam 0.5 milliGRAM(s) Oral three times a day PRN anxiety  aluminum hydroxide/magnesium hydroxide/simethicone Suspension 30 milliLiter(s) Oral every 4 hours PRN Dyspepsia  diphenhydrAMINE Injectable 25 milliGRAM(s) IV Push every 6 hours PRN Rash and/or Itching  melatonin 3 milliGRAM(s) Oral at bedtime PRN Insomnia  ondansetron Injectable 4 milliGRAM(s) IV Push every 8 hours PRN Nausea and/or Vomiting  polyethylene glycol 3350 17 Gram(s) Oral daily PRN Constipation  traMADol 25 milliGRAM(s) Oral every 4 hours PRN Moderate Pain (4 - 6)                          MEDICATIONS  (STANDING):  allopurinol 300 milliGRAM(s) Oral daily  diphenhydrAMINE Injectable 25 milliGRAM(s) IV Push at bedtime  DULoxetine 120 milliGRAM(s) Oral daily  fluticasone propionate 50 MICROgram(s)/spray Nasal Spray 1 Spray(s) Both Nostrils two times a day  levothyroxine 150 MICROGram(s) Oral daily  montelukast 10 milliGRAM(s) Oral daily  pantoprazole    Tablet 40 milliGRAM(s) Oral before breakfast  sodium chloride 0.9%. 1000 milliLiter(s) (100 mL/Hr) IV Continuous <Continuous>    MEDICATIONS  (PRN):  acetaminophen     Tablet .. 650 milliGRAM(s) Oral every 6 hours PRN Temp greater or equal to 38C (100.4F), Mild Pain (1 - 3)  ALPRAZolam 0.5 milliGRAM(s) Oral three times a day PRN anxiety  aluminum hydroxide/magnesium hydroxide/simethicone Suspension 30 milliLiter(s) Oral every 4 hours PRN Dyspepsia  diphenhydrAMINE Injectable 25 milliGRAM(s) IV Push every 6 hours PRN Rash and/or Itching  melatonin 3 milliGRAM(s) Oral at bedtime PRN Insomnia  ondansetron Injectable 4 milliGRAM(s) IV Push every 8 hours PRN Nausea and/or Vomiting  polyethylene glycol 3350 17 Gram(s) Oral daily PRN Constipation  traMADol 25 milliGRAM(s) Oral every 4 hours PRN Moderate Pain (4 - 6)                                8.2    5.98  )-----------( 74       ( 19 Feb 2023 06:52 )             25.3     20 Feb 2023 08:15    137    |  106    |  11     ----------------------------<  122    4.3     |  28     |  0.60     Ca    9.3        20 Feb 2023 08:15    TPro  8.6    /  Alb  2.5    /  TBili  1.3    /  DBili  x      /  AST  29     /  ALT  17     /  AlkPhos  282    20 Feb 2023 08:15    LIVER FUNCTIONS - ( 20 Feb 2023 08:15 )  Alb: 2.5 g/dL / Pro: 8.6 gm/dL / ALK PHOS: 282 U/L / ALT: 17 U/L / AST: 29 U/L / GGT: x             CAPILLARY BLOOD GLUCOSE            Assessment and Plan:        68 year old Female with PMHx of Waldenstrom lymphoma dx 15 years ago on oral chemo trial Venetoclax presented to the ED with complain of abdominal pain, dark urine, yellowish stool. Patient also notes fever in the evening, nausea, yellowed eyes. Denies vomiting. Patient was in hospital in [place] 2 weeks ago for 2 weeks for similar symptoms. But no specific reason for her symptoms were identified She was advised to come to hospital if her symptoms get worse. As per Abdominal pain is worse than before, she has come to the hospital again. Her Abdominal pain is very localized in RUQ area. Patient has h/o cholecystectomy. No other injuries or complaints.      1.  Abdominal pain  Jaundice  --CT abd-no acute finding  -MRCP results noted and concern for the hemochromatosis with the splenomegaly   -GI consult appreciated and suggested to follow up with the hemochromatosis gene   - has high ferritin levels and high transferrin  levels    - bilirubin  stable and MRCP shows no obstruction and shows gradual tapering of CBD   - patient family wanted second G.I opinion regarding findings on the MRCP   She was seen by Dr Jiang yesterday. IR consulted for liver biopsy    2.  anemia and thrombocytopenia with the stable Hb of 8.2 and platelet of 75   could be related with the immunotherapy drug venataclox or with the underlying lymphoma   she does have high LDH and low hematoglobulin levels with the possibility of hemolysis   would wait for the input from the hematology regarding the anemia and indication for the prbc and work up thrombocytopenia     3.  Hypothyroidism  continue with her home dose of synthroid    4 anxiety   continue cymbalta her home dose 120  mg po daily      Dvt prophylaxis with the compression stockings   Code status: Full code.                 
Patient is a 68y old  Female who presents with a chief complaint of Abdominal pain, Jaundice (17 Feb 2023 15:14)      HPI:  68 year old Female with PMHx of Waldenstrom lymphoma dx 15 years ago on oral chemo trial Venetoclax presented to the ED with complain of abdominal pain, dark urine, yellowish stool. Patient also notes fever in the evening, nausea, yellowed eyes. Denies vomiting. Patient was in hospital in [place] 2 weeks ago for 2 weeks for similar symptoms. But no specific reason for her symptoms were identified She was advised to come to hospital if her symptoms get worse. As per Abdominal pain is worse than before, she has come to the hospital again. Her Abdominal pain is very localized in RUQ area. Patient has h/o cholecystectomy. No other injuries or complaints. (17 Feb 2023 01:25)    Patient feels well currently. No nausea, tolerating po. MRCP - no obstruction. Suggestion of hemochromatosis. Bili is down.    PAST MEDICAL & SURGICAL HISTORY:  Lymphoma      No significant past surgical history          MEDICATIONS  (STANDING):  allopurinol 300 milliGRAM(s) Oral daily  diphenhydrAMINE Injectable 25 milliGRAM(s) IV Push at bedtime  DULoxetine 30 milliGRAM(s) Oral daily  fluticasone propionate 50 MICROgram(s)/spray Nasal Spray 1 Spray(s) Both Nostrils two times a day  levothyroxine 150 MICROGram(s) Oral daily  montelukast 10 milliGRAM(s) Oral daily  pantoprazole    Tablet 40 milliGRAM(s) Oral before breakfast  sodium chloride 0.9%. 1000 milliLiter(s) (100 mL/Hr) IV Continuous <Continuous>    MEDICATIONS  (PRN):  acetaminophen     Tablet .. 650 milliGRAM(s) Oral every 6 hours PRN Temp greater or equal to 38C (100.4F), Mild Pain (1 - 3)  ALPRAZolam 0.5 milliGRAM(s) Oral three times a day PRN anxiety  aluminum hydroxide/magnesium hydroxide/simethicone Suspension 30 milliLiter(s) Oral every 4 hours PRN Dyspepsia  diphenhydrAMINE Injectable 25 milliGRAM(s) IV Push every 6 hours PRN Rash and/or Itching  melatonin 3 milliGRAM(s) Oral at bedtime PRN Insomnia  ondansetron Injectable 4 milliGRAM(s) IV Push every 8 hours PRN Nausea and/or Vomiting  traMADol 25 milliGRAM(s) Oral every 4 hours PRN Moderate Pain (4 - 6)      Allergies    Rituxan (Anaphylaxis)    Intolerances          Vital Signs Last 24 Hrs  T(C): 37.1 (18 Feb 2023 08:12), Max: 37.1 (17 Feb 2023 15:31)  T(F): 98.8 (18 Feb 2023 08:12), Max: 98.8 (17 Feb 2023 15:31)  HR: 84 (18 Feb 2023 08:12) (78 - 84)  BP: 120/59 (18 Feb 2023 08:12) (116/54 - 136/63)  BP(mean): 80 (18 Feb 2023 05:04) (80 - 80)  RR: 18 (18 Feb 2023 08:12) (18 - 18)  SpO2: 98% (18 Feb 2023 08:12) (96% - 98%)    Parameters below as of 18 Feb 2023 08:12  Patient On (Oxygen Delivery Method): room air        PHYSICAL EXAM:    Constitutional: NAD, well-developed  Respiratory: CTAB  Cardiovascular: S1 and S2, RRR, no M/G/R  Gastrointestinal: BS+, soft, NT/ND    LABS:                        7.1    5.57  )-----------( 69       ( 18 Feb 2023 07:15 )             21.9     02-18    136  |  106  |  14  ----------------------------<  111<H>  3.8   |  26  |  0.69    Ca    8.6      18 Feb 2023 07:15    TPro  8.1  /  Alb  2.2<L>  /  TBili  1.7<H>  /  DBili  0.6<H>  /  AST  23  /  ALT  16  /  AlkPhos  237<H>  02-18    PT/INR - ( 16 Feb 2023 17:33 )   PT: 12.8 sec;   INR: 1.10 ratio         PTT - ( 16 Feb 2023 17:33 )  PTT:32.6 sec  LIVER FUNCTIONS - ( 18 Feb 2023 07:15 )  Alb: 2.2 g/dL / Pro: 8.1 gm/dL / ALK PHOS: 237 U/L / ALT: 16 U/L / AST: 23 U/L / GGT: x             RADIOLOGY & ADDITIONAL STUDIES:
INTERVAL HPI/OVERNIGHT EVENTS:  Patient S&E at bedside. No o/n events,   reports night sweats overnight  planned for liver biopsy today   labs reviewed    PAST MEDICAL & SURGICAL HISTORY:  Lymphoma      No significant past surgical history          FAMILY HISTORY:  No pertinent family history in first degree relatives        VITAL SIGNS:  T(F): 99 (02-21-23 @ 08:25)  HR: 79 (02-21-23 @ 08:25)  BP: 134/59 (02-21-23 @ 08:25)  RR: 17 (02-21-23 @ 08:25)  SpO2: 97% (02-21-23 @ 08:25)  Wt(kg): --    PHYSICAL EXAM:    Constitutional: NAD  Respiratory: CTA b/l, good air entry b/l  Cardiovascular: RRR, no M/R/G  Gastrointestinal: mild pain in abdomen, hepatomegaly  Extremities: no c/c/e  Neurological: AAOx3      MEDICATIONS  (STANDING):  allopurinol 300 milliGRAM(s) Oral daily  DULoxetine 120 milliGRAM(s) Oral daily  fluticasone propionate 50 MICROgram(s)/spray Nasal Spray 1 Spray(s) Both Nostrils two times a day  levothyroxine 150 MICROGram(s) Oral daily  montelukast 10 milliGRAM(s) Oral daily  pantoprazole    Tablet 40 milliGRAM(s) Oral before breakfast  sodium chloride 0.9%. 1000 milliLiter(s) (100 mL/Hr) IV Continuous <Continuous>    MEDICATIONS  (PRN):  acetaminophen     Tablet .. 650 milliGRAM(s) Oral every 6 hours PRN Temp greater or equal to 38C (100.4F), Mild Pain (1 - 3)  ALPRAZolam 0.5 milliGRAM(s) Oral three times a day PRN anxiety  aluminum hydroxide/magnesium hydroxide/simethicone Suspension 30 milliLiter(s) Oral every 4 hours PRN Dyspepsia  diphenhydrAMINE Injectable 25 milliGRAM(s) IV Push every 6 hours PRN Rash and/or Itching  melatonin 3 milliGRAM(s) Oral at bedtime PRN Insomnia  ondansetron Injectable 4 milliGRAM(s) IV Push every 8 hours PRN Nausea and/or Vomiting  polyethylene glycol 3350 17 Gram(s) Oral daily PRN Constipation  traMADol 25 milliGRAM(s) Oral every 4 hours PRN Moderate Pain (4 - 6)      Allergies    Rituxan (Anaphylaxis)    Intolerances        LABS:                        7.8    3.90  )-----------( 52       ( 21 Feb 2023 07:11 )             24.3     02-20    137  |  106  |  11  ----------------------------<  122<H>  4.3   |  28  |  0.60    Ca    9.3      20 Feb 2023 08:15    TPro  8.6<H>  /  Alb  2.5<L>  /  TBili  1.3<H>  /  DBili  x   /  AST  29  /  ALT  17  /  AlkPhos  282<H>  02-20          RADIOLOGY & ADDITIONAL TESTS:  Studies reviewed.  
    [facility] complaints and Diagnosis: jaundice, RUQ pain    Subjective:     patient currently denies any abdominal pain   no fevers   seen by the JACINDA and family wanted second opinion    has constipation and was given miralax yesterday       REVIEW OF SYSTEMS:    CONSTITUTIONAL: No weakness,   EYES/ENT: No visual changes;  No vertigo or throat pain   NECK: No pain or stiffness  RESPIRATORY: No cough, wheezing, hemoptysis; No shortness of breath  CARDIOVASCULAR: No chest pain or palpitations  GASTROINTESTINAL: No abdominal or epigastric pain or nausea or vomiting and has constipation   GENITOURINARY: No dysuria, frequency or hematuria  NEUROLOGICAL: No numbness or weakness  SKIN: No itching, burning, rashes, or lesions   All other review of systems is negative unless indicated above      Vital Signs Last 24 Hrs  T(C): 37.1 (19 Feb 2023 08:25), Max: 37.1 (19 Feb 2023 08:25)  T(F): 98.8 (19 Feb 2023 08:25), Max: 98.8 (19 Feb 2023 08:25)  HR: 80 (19 Feb 2023 08:25) (80 - 89)  BP: 121/58 (19 Feb 2023 08:25) (121/58 - 126/85)  BP(mean): --  RR: 18 (19 Feb 2023 08:25) (18 - 18)  SpO2: 98% (19 Feb 2023 08:25) (98% - 98%)    Parameters below as of 19 Feb 2023 08:25  Patient On (Oxygen Delivery Method): room air            HEENT:   pupils equal and reactive, EOMI,    NECK:   supple, no carotid bruits, no palpable lymph nodes, no thyromegaly    CV:  +S1, +S2, regular, no murmurs or rubs    RESP:   lungs clear to auscultation bilaterally, no wheezing, rales, rhonchi, good air entry bilaterally    BREAST:  not examined    GI:  abdomen soft, non-tender, non-distended, normal BS, no bruits, no abdominal masses, no palpable masses    RECTAL:  not examined    :  not examined    MSK:   normal muscle tone, no atrophy, no rigidity, no contractions    EXT:  left leg more swollen than right leg with no calf tenderness and has mild pitting edema which is chronic as per the patient     VASCULAR:  pulses equal and symmetric in the upper and lower extremities    NEURO:  AAOX3, no focal neurological deficits, follows all commands, able to move extremities spontaneously      MEDICATIONS  (STANDING):  allopurinol 300 milliGRAM(s) Oral daily  diphenhydrAMINE Injectable 25 milliGRAM(s) IV Push at bedtime  DULoxetine 120 milliGRAM(s) Oral daily  fluticasone propionate 50 MICROgram(s)/spray Nasal Spray 1 Spray(s) Both Nostrils two times a day  levothyroxine 150 MICROGram(s) Oral daily  montelukast 10 milliGRAM(s) Oral daily  pantoprazole    Tablet 40 milliGRAM(s) Oral before breakfast  sodium chloride 0.9%. 1000 milliLiter(s) (100 mL/Hr) IV Continuous <Continuous>    MEDICATIONS  (PRN):  acetaminophen     Tablet .. 650 milliGRAM(s) Oral every 6 hours PRN Temp greater or equal to 38C (100.4F), Mild Pain (1 - 3)  ALPRAZolam 0.5 milliGRAM(s) Oral three times a day PRN anxiety  aluminum hydroxide/magnesium hydroxide/simethicone Suspension 30 milliLiter(s) Oral every 4 hours PRN Dyspepsia  diphenhydrAMINE Injectable 25 milliGRAM(s) IV Push every 6 hours PRN Rash and/or Itching  melatonin 3 milliGRAM(s) Oral at bedtime PRN Insomnia  ondansetron Injectable 4 milliGRAM(s) IV Push every 8 hours PRN Nausea and/or Vomiting  polyethylene glycol 3350 17 Gram(s) Oral daily PRN Constipation  traMADol 25 milliGRAM(s) Oral every 4 hours PRN Moderate Pain (4 - 6)                        8.2    5.98  )-----------( 75       ( 19 Feb 2023 06:52 )             25.3   02-19    138  |  108  |  13  ----------------------------<  120<H>  4.7   |  28  |  0.67    Ca    9.2      19 Feb 2023 06:53    TPro  8.7<H>  /  Alb  2.4<L>  /  TBili  1.5<H>  /  DBili  x   /  AST  22  /  ALT  17  /  AlkPhos  271<H>  02-19     Radiology     < from: MR MRCP No Cont (02.17.23 @ 18:06) >    IMPRESSION:  Hepatosplenomegaly. Diffuse signal attention of the liver and spleen on   in-phase T1-weighted gradient echo images may represent hemachromatosis.   Clinical correlation with serum ferritin level is recommended.    ct abdomen and pelvis   FINDINGS:  LOWER CHEST: Within normal limits.    LIVER: Steatosis. Liver is enlarged and measures 23.8 cm in length.  BILE DUCTS: Normal caliber.  GALLBLADDER: Cholecystectomy.  SPLEEN: Splenomegaly. Spleen measures 17.6 cm in length.  PANCREAS: Within normal limits.  ADRENALS: Within normal limits.  KIDNEYS/URETERS: No renal stones or hydronephrosis.    BLADDER: Minimally distended.  REPRODUCTIVE ORGANS: Uterus and adnexa within normal limits.    BOWEL: No bowel obstruction. Appendix is not visualized. No evidence of   inflammation in the pericecal region. Scattered diverticulosis without   evidence of acute diverticulitis.  PERITONEUM: No ascites.  VESSELS: Atherosclerotic changes.  RETROPERITONEUM/LYMPH NODES: No lymphadenopathy.  ABDOMINAL WALL: Postsurgical changes.  BONES: Degenerative changes.    IMPRESSION:  Hepatosplenomegaly. No focal intrahepatic mass.    Postcholecystectomy. No biliary ductal dilatation.                              Assessment and Plan:        68 year old Female with PMHx of Waldenstrom lymphoma dx 15 years ago on oral chemo trial Venetoclax presented to the ED with complain of abdominal pain, dark urine, yellowish stool. Patient also notes fever in the evening, nausea, yellowed eyes. Denies vomiting. Patient was in hospital in [place] 2 weeks ago for 2 weeks for similar symptoms. But no specific reason for her symptoms were identified She was advised to come to hospital if her symptoms get worse. As per Abdominal pain is worse than before, she has come to the hospital again. Her Abdominal pain is very localized in RUQ area. Patient has h/o cholecystectomy. No other injuries or complaints.      1.  Abdominal pain  Jaundice  --CT abd-no acute finding  -MRCP results noted and concern for the hemochromatosis with the splenomegaly   -GI consult appreciated and suggested to follow up with the hemochromatosis gene   - has high ferritin levels and high transferrin  levels    - bilirubin  stable and MRCP shows no obstruction and shows gradual tapering of CBD   - patient family wanted second G.I opinion regarding findings on the MRCP   - consulted gondalpho and consult awaited       2.    anemia and thrombocytopenia with the stable Hb of 8.2 and platelet of 75   could be related with the immunotherapy drug venataclox or with the underlying lymphoma   she does have high LDH and low hematoglobulin levels with the possibility of hemolysis   would wait for the input from the hematology regarding the anemia and indication for the prbc and work up thrombocytopenia     3.  Hypothyroidism    continue with her home dose of synthroid    4 anxiety   continue cymbalta her home dose 120  mg po daily      Dvt prophylaxis with the compression stockings   Code status: Full code.     dvt prophylaxis with the compression stockings with the thrombocytopenia will give  sq heparin if cleared by the oncology   she does have chronic left leg swelling which was scanned recently and as per the patient is negative for the dvt             
GI   Pt asked for "second opinion" regarding liver disease    HPI:  68 year old Female with PMHx of Waldenstrom lymphoma dx 15 years ago on oral chemo trial Venetoclax presented to the ED with complain of abdominal pain, dark urine, yellowish stool. Patient also notes fever in the evening, nausea, yellowed eyes. Denies vomiting. Patient was in hospital in [place] 2 weeks ago for 2 weeks for similar symptoms. But no specific reason for her symptoms were identified She was advised to come to hospital if her symptoms get worse. As per Abdominal pain is worse than before, she has come to the hospital again. Her Abdominal pain is very localized in RUQ area. Patient has h/o cholecystectomy. No other injuries or complaints. (17 Feb 2023 01:25)    [place] [facility] [place], RUQ pain, and had 30 lb WL, poor appetite, and jaundice. States imaging was done but no dx made. Now back with similar issues, only low grade temps, postprandial right sided abd pain wrapping around right flank to right back, worse 30 m after eating. Reports nausea, no vomiting, no dysphagia. No use of EtOH. MRCP shows diffuse hepatocellular disease, hepatomegaly, splenomegaly and concern for iron overload. She is s/p CCY and the CBD measures 11 mm with otherwise normal appearance. Family is concerned with biliary obstruction and has questions regarding possible ERCP.      PAST MEDICAL & SURGICAL HISTORY:  Lymphoma      No significant past surgical history          Home Medications:  allopurinol 300 mg oral tablet: 1 tab(s) orally once a day (16 Feb 2023 21:01)  ALPRAZolam 0.5 mg oral tablet: 1 tab(s) orally 3 times a day, As Needed (16 Feb 2023 21:01)  azelastine-fluticasone 137 mcg-50 mcg/inh nasal spray: 1 spray(s) nasal 2 times a day (16 Feb 2023 21:01)  DULoxetine 30 mg oral delayed release capsule: 4 cap(s) orally once a day (16 Feb 2023 21:01)  fluticasone 50 mcg/inh nasal spray: 1 spray(s) nasal once a day (16 Feb 2023 21:01)  levothyroxine 150 mcg (0.15 mg) oral tablet: 1 tab(s) orally once a day (16 Feb 2023 21:01)  montelukast 10 mg oral tablet: 1 tab(s) orally once a day (16 Feb 2023 21:01)  pantoprazole 40 mg oral delayed release tablet: 1 tab(s) orally once a day (16 Feb 2023 21:01)  Venclexta 100 mg oral tablet: 4 tab(s) orally once a day (at bedtime) (16 Feb 2023 21:01)      MEDICATIONS  (STANDING):  allopurinol 300 milliGRAM(s) Oral daily  diphenhydrAMINE Injectable 25 milliGRAM(s) IV Push at bedtime  DULoxetine 120 milliGRAM(s) Oral daily  fluticasone propionate 50 MICROgram(s)/spray Nasal Spray 1 Spray(s) Both Nostrils two times a day  levothyroxine 150 MICROGram(s) Oral daily  montelukast 10 milliGRAM(s) Oral daily  pantoprazole    Tablet 40 milliGRAM(s) Oral before breakfast  sodium chloride 0.9%. 1000 milliLiter(s) (100 mL/Hr) IV Continuous <Continuous>    MEDICATIONS  (PRN):  acetaminophen     Tablet .. 650 milliGRAM(s) Oral every 6 hours PRN Temp greater or equal to 38C (100.4F), Mild Pain (1 - 3)  ALPRAZolam 0.5 milliGRAM(s) Oral three times a day PRN anxiety  aluminum hydroxide/magnesium hydroxide/simethicone Suspension 30 milliLiter(s) Oral every 4 hours PRN Dyspepsia  diphenhydrAMINE Injectable 25 milliGRAM(s) IV Push every 6 hours PRN Rash and/or Itching  melatonin 3 milliGRAM(s) Oral at bedtime PRN Insomnia  ondansetron Injectable 4 milliGRAM(s) IV Push every 8 hours PRN Nausea and/or Vomiting  polyethylene glycol 3350 17 Gram(s) Oral daily PRN Constipation  traMADol 25 milliGRAM(s) Oral every 4 hours PRN Moderate Pain (4 - 6)      Allergies    Rituxan (Anaphylaxis)    Intolerances        SOCIAL HISTORY: no etoh    FAMILY HISTORY:  No pertinent family history in first degree relatives  No liver disease except NAFLD in family  no known FHx hemochromatosis      ROS  As above  Otherwise unremarkable, all systems reviewed    PE:  Vital Signs Last 24 Hrs  T(C): 36.9 (19 Feb 2023 15:29), Max: 37.1 (19 Feb 2023 08:25)  T(F): 98.4 (19 Feb 2023 15:29), Max: 98.8 (19 Feb 2023 08:25)  HR: 78 (19 Feb 2023 15:29) (78 - 89)  BP: 122/60 (19 Feb 2023 15:29) (121/58 - 126/85)  BP(mean): --  RR: 18 (19 Feb 2023 15:29) (18 - 18)  SpO2: 98% (19 Feb 2023 15:29) (98% - 98%)    Parameters below as of 19 Feb 2023 15:29  Patient On (Oxygen Delivery Method): room air        Constitutional: NAD, well-developed, A+Ox3  family at bedside  mildly icteric   Respiratory: CTABL, breathing comfortably  Cardiovascular: S1 and S2, RRR  Gastrointestinal: +BS, soft, diffuse RUQ tenderness and hepatomegaly, non distended, no mass  Extremities: warm, well perfused, no edema  Psychiatric: Normal mood, normal affect  Neuro: moves all extremities, grossly intact  Skin: No rashes or lesions    LABS:                        8.2    5.98  )-----------( 75       ( 19 Feb 2023 06:52 )             25.3     02-19    138  |  108  |  13  ----------------------------<  120<H>  4.7   |  28  |  0.67    Ca    9.2      19 Feb 2023 06:53    TPro  8.7<H>  /  Alb  2.4<L>  /  TBili  1.5<H>  /  DBili  x   /  AST  22  /  ALT  17  /  AlkPhos  271<H>  02-19      LIVER FUNCTIONS - ( 19 Feb 2023 06:53 )  Alb: 2.4 g/dL / Pro: 8.7 gm/dL / ALK PHOS: 271 U/L / ALT: 17 U/L / AST: 22 U/L / GGT: x             RADIOLOGY & ADDITIONAL STUDIES:  MRI abdomen reviewed:    ACC: [id number] EXAM:  MR MRCP   ORDERED BY: STUART PALMER     PROCEDURE DATE:  02/17/2023          INTERPRETATION:  CLINICAL INFORMATION: Right upper quadrant abdominal   tenderness. Elevated bilirubin and liver enzymes. Status post   cholecystectomy.    COMPARISON: No prior abdominal MR is available for comparison. Reference   is made with a previous abdominal CT dated 2/16/2023.    CONTRAST/COMPLICATIONS:  IV Contrast: NONE  Oral Contrast: NONE  Complications: None reported at time of study completion    PROCEDURE:  MRI of the abdomen was performed.  MRCP was performed.    FINDINGS:  LOWER CHEST: Within normal limits.    LIVER: Hepatomegaly. Diffuse signal attenuation of the liver on in-phase   T1-weighted gradient echo images. 2 small brightly T2 hyperintense   lesions in the liver may represent cysts or hemangiomas.  BILE DUCTS: No evidence for choledocholithiasis. Mildly dilated common   duct measuring 11 mm in caliber with gradual tapering distally may be due   to post cholecystectomy status. There is low medial insertion of the   cystic duct.    GALLBLADDER: Status post cholecystectomy.  SPLEEN: Splenomegaly measuring 18.3 cm. Diffuse signal attenuation of the   spleen on in-phase T1-weighted gradient echo images. A few slightly   hyperintense lesions in the spleen on diffusion-weighted images measuring   up to 1.9 cm, difficult to further characterize without IV contrast.  PANCREAS: Within normal limits. The main pancreatic duct maintains normal   caliber.  ADRENALS: Within normal limits.  KIDNEYS/URETERS: Multiple small brightly T2 hyperintense lesions in the   kidneys bilaterally, representing probable cysts. Small fat-containing   lesion in the upper pole of the left kidney, compatible with an   angiomyolipoma.    VISUALIZED PORTIONS:  BOWEL: Within normal limits.  PERITONEUM: No ascites.  VESSELS: Within normal limits.  RETROPERITONEUM/LYMPH NODES: No lymphadenopathy.  ABDOMINAL WALL: Within normal limits.  BONES: Within normal limits.    IMPRESSION:  Hepatosplenomegaly. Diffuse signal attention of the liver and spleen on   in-phase T1-weighted gradient echo images may represent hemachromatosis.   Clinical correlation with serum ferritin level is recommended.    A few slightly hyperintense lesions in the spleen on diffusion-weighted   images measuring up to 1.9 cm, difficult to further characterize without   IV contrast.    No evidence for choledocholithiasis. Mildly dilated common duct measuring   11 mm in caliber with gradual tapering distally may be due to post   cholecystectomy status. If there is a clinical suspicion for biliary   obstruction, ERCP may be pursued for further evaluation.    --- End of Report ---            GENE GALLEGOS MD; Attending Radiologist  This document has been electronically signed. Feb 17 2023  6:35PM  
GI   Pt seen 9am today-late note entry    HPI:  s/p liver bx  no abd pain today  wants to go home  elvia po    ROS  As above  Otherwise unremarkable, all systems reviewed    PE:  Vital Signs Last 24 Hrs  T(C): 36.9 (22 Feb 2023 09:15), Max: 37.5 (21 Feb 2023 21:38)  T(F): 98.4 (22 Feb 2023 09:15), Max: 99.5 (21 Feb 2023 21:38)  HR: 88 (22 Feb 2023 09:15) (81 - 89)  BP: 131/58 (22 Feb 2023 09:15) (112/64 - 140/56)  BP(mean): 72 (22 Feb 2023 09:15) (72 - 72)  RR: 16 (22 Feb 2023 09:15) (14 - 18)  SpO2: 99% (22 Feb 2023 09:15) (97% - 100%)    Parameters below as of 22 Feb 2023 09:15  Patient On (Oxygen Delivery Method): room air      Constitutional: NAD, well-developed, A+Ox3  Respiratory: CTABL, breathing comfortably  Cardiovascular: S1 and S2, RRR  Gastrointestinal: +BS, soft, NT, non distended, no mass  Extremities: warm, well perfused, no edema  Psychiatric: Normal mood, normal affect  Neuro: moves all extremities, grossly intact  Skin: No rashes or lesions    LABS:

## 2023-02-22 NOTE — DISCHARGE NOTE PROVIDER - CARE PROVIDER_API CALL
Norma Velazquez  FAMILY MEDICINE  [address] [place] [address], [place] [address]  Phone: [phone]  Fax: [phone]  Follow Up Time:

## 2023-02-22 NOTE — DISCHARGE NOTE PROVIDER - NSDCHOSPICE_GEN_A_CORE
Met with patient to schedule surgery with Dr. Sanchez    Surgery was scheduled on 5/6 at [facility]  Patient will have H&P at [facility]     Patient is aware a COVID-19 test is needed before their procedure. The test should be with-in 4 days of their procedure.   Test Details: Date 5/3 Location [facility]    Post-Op visit was scheduled on 5/16  Patient is aware a / is needed day of surgery.   Surgery packet was given 4/12, patient has my direct contact information for any further questions.     Patient has also been quoted for her cosmetic procedure and a message has been sent to [facility].    Procedure: UPPER BLEPHAROPLASTY, BILATERAL, BLEPHAROPLASTY, LOWER EYELID, BILATERAL, COSMETIC     Cosmetic:  BLEPHAROPLASTY, LOWER EYELID, BILATERAL, COSMETIC     Cost:            $3000.00   Anes:             $425.00   Facility:          $584.00     Total:           $4009.00           No

## 2023-02-22 NOTE — PROGRESS NOTE ADULT - REASON FOR ADMISSION
Abdominal pain, Jaundice

## 2023-02-22 NOTE — DISCHARGE NOTE PROVIDER - HOSPITAL COURSE
68 year old Female with PMHx of Waldenstrom lymphoma dx 15 years ago on oral chemo trial Venetoclax presented to the ED with complain of abdominal pain, dark urine, yellowish stool. Patient also notes fever in the evening, nausea, yellowed eyes. Denies vomiting. Patient was in hospital in [place] 2 weeks ago for 2 weeks for similar symptoms. But no specific reason for her symptoms were identified She was advised to come to hospital if her symptoms get worse. As per Abdominal pain is worse than before, she has come to the hospital again. Her Abdominal pain is very localized in RUQ area. Patient has h/o cholecystectomy. No other injuries or complaints.      Hospital course: She was admitted to medical floor. She was seen by heme onc and also GI. CT abdomen and pelvis-no acute changes. MRCP-no obstruction. Found to have hepatospleenomegaly  concern for  Hemochromatosis. She was closely followed by hematology. Iron studies shows high ferritin and tranferrin levels. She went for liver biopsy yesterday. Patient anxious to go home. Abdomen pain much better, tolerating diet well. She was advised to follow up with oncology  for liver biopsy report.       Vital Signs Last 24 Hrs  T(C): 36.9 (22 Feb 2023 09:15), Max: 37.5 (21 Feb 2023 21:38)  T(F): 98.4 (22 Feb 2023 09:15), Max: 99.5 (21 Feb 2023 21:38)  HR: 88 (22 Feb 2023 09:15) (73 - 89)  BP: 131/58 (22 Feb 2023 09:15) (102/60 - 142/69)  BP(mean): 72 (22 Feb 2023 09:15) (72 - 72)  RR: 16 (22 Feb 2023 09:15) (14 - 18)  SpO2: 99% (22 Feb 2023 09:15) (97% - 100%)    Parameters below as of 22 Feb 2023 09:15  Patient On (Oxygen Delivery Method): room air          Physical Exam:   HEENT:   pupils equal and reactive, EOMI,  NECK:   supple, no carotid bruits, no palpable lymph nodes, no thyromegaly  CV:  +S1, +S2, regular, no murmurs or rubs  RESP:   lungs clear to auscultation bilaterally, no wheezing, rales, rhonchi, good air entry bilaterally  GI:  abdomen soft, non-tender, non-distended, normal BS, no bruits, no abdominal masses, no palpable masses  MSK:   normal muscle tone, no atrophy, no rigidity, no contractions  EXT:  left leg more swollen than right leg with no calf tenderness and has mild pitting edema which is chronic as per the patient  VASCULAR:  pulses equal and symmetric in the upper and lower extremities  NEURO:  AAOX3, no focal neurological deficits, follows all commands, able to move extremities spontaneously        1.  Abdominal pain  Jaundice  S/P liver biopsy yesterday, follow report as an outpatient  --CT abd-no acute finding  -MRCP results noted and concern for the hemochromatosis with the hepatospenomegaly  -GI consult appreciated and suggested to follow up with the hemochromatosis gene as an outpatient  - has high ferritin levels and high transferrin  levels    - bilirubin  stable and MRCP shows no obstruction and shows gradual tapering of CBD   She was seen by Dr Jiang  IR consulted for liver biopsy-follow biopsy report as an outpatient    2.  anemia and thrombocytopenia-stable  could be related with the immunotherapy drug venataclox or with the underlying lymphoma   Heme follow up appreciated    3.  Hypothyroidism  continue with her home dose of synthroid    4 anxiety   continue cymbalta her home dose 120  mg po daily      Home today  Spent more than 30 min to prepare the discharge   68 year old Female with PMHx of Waldenstrom lymphoma dx 15 years ago on oral chemo trial Venetoclax presented to the ED with complain of abdominal pain, dark urine, yellowish stool. Patient also notes fever in the evening, nausea, yellowed eyes. Denies vomiting. Patient was in hospital in [place] 2 weeks ago for 2 weeks for similar symptoms. But no specific reason for her symptoms were identified She was advised to come to hospital if her symptoms get worse. As per Abdominal pain is worse than before, she has come to the hospital again. Her Abdominal pain is very localized in RUQ area. Patient has h/o cholecystectomy. No other injuries or complaints.      Hospital course: She was admitted to medical floor. She was seen by heme onc and also GI. CT abdomen and pelvis-no acute changes. MRCP-no obstruction. Found to have hepatospleenomegaly  concern for  Hemochromatosis. She was closely followed by hematology. Iron studies shows high ferritin and tranferrin levels. She went for liver biopsy yesterday. Patient anxious to go home. Abdomen pain much better, tolerating diet well. She was advised to follow up with oncology  for liver biopsy report.       Vital Signs Last 24 Hrs  T(C): 36.9 (22 Feb 2023 09:15), Max: 37.5 (21 Feb 2023 21:38)  T(F): 98.4 (22 Feb 2023 09:15), Max: 99.5 (21 Feb 2023 21:38)  HR: 88 (22 Feb 2023 09:15) (73 - 89)  BP: 131/58 (22 Feb 2023 09:15) (102/60 - 142/69)  BP(mean): 72 (22 Feb 2023 09:15) (72 - 72)  RR: 16 (22 Feb 2023 09:15) (14 - 18)  SpO2: 99% (22 Feb 2023 09:15) (97% - 100%)    Parameters below as of 22 Feb 2023 09:15  Patient On (Oxygen Delivery Method): room air          Physical Exam:   HEENT:   pupils equal and reactive, EOMI,  NECK:   supple, no carotid bruits, no palpable lymph nodes, no thyromegaly  CV:  +S1, +S2, regular, no murmurs or rubs  RESP:   lungs clear to auscultation bilaterally, no wheezing, rales, rhonchi, good air entry bilaterally  GI:  abdomen soft, non-tender, non-distended, normal BS, no bruits, no abdominal masses, no palpable masses  MSK:   normal muscle tone, no atrophy, no rigidity, no contractions  EXT:  left leg more swollen than right leg with no calf tenderness and has mild pitting edema which is chronic as per the patient  VASCULAR:  pulses equal and symmetric in the upper and lower extremities  NEURO:  AAOX3, no focal neurological deficits, follows all commands, able to move extremities spontaneously        1.  Abdominal pain  Jaundice  S/P liver biopsy yesterday, follow report as an outpatient  --CT abd-no acute finding  -MRCP results noted and concern for the hemochromatosis with the hepatospenomegaly  -GI consult appreciated and suggested to follow up with the hemochromatosis gene as an outpatient  - has high ferritin levels and high transferrin  levels    - bilirubin  stable and MRCP shows no obstruction and shows gradual tapering of CBD   She was seen by Dr Jiang  IR consulted for liver biopsy-follow biopsy report as an outpatient    2.  anemia and thrombocytopenia-stable.   No pancytopenia  could be related with the immunotherapy drug venataclox or/and with the underlying lymphoma   Heme follow up appreciated    3.  Hypothyroidism  continue with her home dose of synthroid    4 anxiety   continue cymbalta her home dose 120  mg po daily      Home today  Spent more than 30 min to prepare the discharge   68 year old Female with PMHx of Waldenstrom lymphoma dx 15 years ago on oral chemo trial Venetoclax presented to the ED with complain of abdominal pain, dark urine, yellowish stool. Patient also notes fever in the evening, nausea, yellowed eyes. Denies vomiting. Patient was in hospital in [place] 2 weeks ago for 2 weeks for similar symptoms. But no specific reason for her symptoms were identified She was advised to come to hospital if her symptoms get worse. As per Abdominal pain is worse than before, she has come to the hospital again. Her Abdominal pain is very localized in RUQ area. Patient has h/o cholecystectomy. No other injuries or complaints.      Hospital course: She was admitted to medical floor. She was seen by heme onc and also GI. CT abdomen and pelvis-no acute changes. MRCP-no obstruction. Found to have hepatospleenomegaly  concern for  Hemochromatosis. She was closely followed by hematology. Iron studies shows high ferritin and tranferrin levels. She went for liver biopsy yesterday. Patient anxious to go home. Abdomen pain much better, tolerating diet well. She was advised to follow up with oncology  for liver biopsy report.       Vital Signs Last 24 Hrs  T(C): 36.9 (22 Feb 2023 09:15), Max: 37.5 (21 Feb 2023 21:38)  T(F): 98.4 (22 Feb 2023 09:15), Max: 99.5 (21 Feb 2023 21:38)  HR: 88 (22 Feb 2023 09:15) (73 - 89)  BP: 131/58 (22 Feb 2023 09:15) (102/60 - 142/69)  BP(mean): 72 (22 Feb 2023 09:15) (72 - 72)  RR: 16 (22 Feb 2023 09:15) (14 - 18)  SpO2: 99% (22 Feb 2023 09:15) (97% - 100%)    Parameters below as of 22 Feb 2023 09:15  Patient On (Oxygen Delivery Method): room air          Physical Exam:   HEENT:   pupils equal and reactive, EOMI,  NECK:   supple, no carotid bruits, no palpable lymph nodes, no thyromegaly  CV:  +S1, +S2, regular, no murmurs or rubs  RESP:   lungs clear to auscultation bilaterally, no wheezing, rales, rhonchi, good air entry bilaterally  GI:  abdomen soft, non-tender, non-distended, normal BS, no bruits, no abdominal masses, no palpable masses  MSK:   normal muscle tone, no atrophy, no rigidity, no contractions  EXT:  left leg more swollen than right leg with no calf tenderness and has mild pitting edema which is chronic as per the patient  VASCULAR:  pulses equal and symmetric in the upper and lower extremities  NEURO:  AAOX3, no focal neurological deficits, follows all commands, able to move extremities spontaneously        1.  Abdominal pain  Jaundice possibly associated with hemochromatosis  S/P liver biopsy yesterday, follow report as an outpatient  --CT abd-no acute finding  -MRCP results noted and concern for the hemochromatosis with the hepatospenomegaly  -GI consult appreciated and suggested to follow up with the hemochromatosis gene as an outpatient  - has high ferritin levels and high transferrin  levels    - bilirubin  stable and MRCP shows no obstruction and shows gradual tapering of CBD   She was seen by Dr Jiang  IR consulted for liver biopsy-follow biopsy report as an outpatient    2.  anemia and thrombocytopenia-stable.   No pancytopenia  could be related with the immunotherapy drug venataclox or/and with the underlying lymphoma   Heme follow up appreciated    3.  Hypothyroidism  continue with her home dose of synthroid    4 anxiety   continue cymbalta her home dose 120  mg po daily      Home today  Spent more than 30 min to prepare the discharge

## 2023-02-22 NOTE — DISCHARGE NOTE PROVIDER - NSDCMRMEDTOKEN_GEN_ALL_CORE_FT
allopurinol 300 mg oral tablet: 1 tab(s) orally once a day  ALPRAZolam 0.5 mg oral tablet: 1 tab(s) orally 3 times a day, As Needed  azelastine-fluticasone 137 mcg-50 mcg/inh nasal spray: 1 spray(s) nasal 2 times a day  DULoxetine 30 mg oral delayed release capsule: 4 cap(s) orally once a day  fluticasone 50 mcg/inh nasal spray: 1 spray(s) nasal once a day  levothyroxine 150 mcg (0.15 mg) oral tablet: 1 tab(s) orally once a day  montelukast 10 mg oral tablet: 1 tab(s) orally once a day  pantoprazole 40 mg oral delayed release tablet: 1 tab(s) orally once a day  Venclexta 100 mg oral tablet: 4 tab(s) orally once a day (at bedtime)

## 2023-02-22 NOTE — DISCHARGE NOTE NURSING/CASE MANAGEMENT/SOCIAL WORK - PATIENT PORTAL LINK FT
You can access the FollowMyHealth Patient Portal offered by [facility] by registering at the following website: [email or web address]. By joining [facility]’s FollowMyHealth portal, you will also be able to view your health information using other applications (apps) compatible with our system.

## 2023-02-22 NOTE — PROGRESS NOTE ADULT - ASSESSMENT
68F with Waldenstrom macroglobulinemia (WM) on treatment with progressive RUQ pain and weight loss.   Hepatosplenomegaly on imaging, which may be due to WM or secondary to iron overload, amyloid deposition, or other etiologies. LFT pattern suggests infiltrative or obstructive causes.     Rec:  -do not think that the mild CBD dilation in post john state is reason to suspect retained CBD stone as likely. She is requesting ERCP but would work up differently and if no etiology found on workup below would then perform EUS and then ERCP only if necessary.  -trend LFTs  -check hemochromatosis genes as outpt  -recommend onc follow up  -think liver biopsy is the next step to evaluate the source of her hepatic disease, r/o lymphoma vs iron overload vs. amyloid.  -consider reducing dose of hepatically cleared drugs (ex. Cymbalta)    45 min spent with pt and family today. Multiple question's answered. 
68F with Waldenstrom macroglobulinemia (WM) on treatment with progressive RUQ pain and weight loss.   Hepatosplenomegaly on imaging, which may be due to WM or secondary to iron overload, amyloid deposition, or other etiologies. LFT pattern suggests infiltrative or obstructive causes.     Rec:  -f/u liver bx as outpt  -check hemochromatosis genes as outpt  -consider reducing dose of hepatically cleared drugs (ex. Cymbalta)  -f/u outpt in 2 weeks
69 yo female with history of Waldenstrom's, now with elevated liver enzymes. MRCP - no obstruction, but suggestion of hemochromatosis. HFE gene testing pending. Patient can be discharged from GI perspective with close outpatient follow up. 
This is a 67 yo female followed by [facility] by Dr. Carlin for WM  now presently on venetoclax now presenting with abdominal pain     Lymphoplasmacytic Lymphoma   - now on Venetoclax   - previously on ZANIBRUTINIB with good response lasting about 2 years   - initially treated with chemoimmunotherapy followed by CAR-T   - now with ongoing cytopenias likely sequalae for WM   - pending QUIGGS, sFLC, and Immunofixation as well as SPEP  - pending serum viscosity     LFT abnormalities   - discussed with GI   - presently pending Liver biopsy today  - patient noted on MRCP possible hemachromatosis- unable to send genetic panel in house- will need to send outpatient, reports no known family history   - unfortunately MR did not render T2* score as an estimation of iron deposition   - no reported history of transfusion dependence  - plt 52k today, Hb 7.8, WBC 3.9 today- bili rending down, LFTs normalized

## 2023-02-22 NOTE — DISCHARGE NOTE PROVIDER - DETAILS OF MALNUTRITION DIAGNOSIS/DIAGNOSES
This patient has been assessed with a concern for Malnutrition and was treated during this hospitalization for the following Nutrition diagnosis/diagnoses:     -  02/17/2023: Severe protein-calorie malnutrition

## 2023-02-22 NOTE — PROGRESS NOTE ADULT - PROVIDER SPECIALTY LIST ADULT
Heme/Onc
Hospitalist
Gastroenterology
Gastroenterology
Hospitalist
Gastroenterology

## 2023-02-22 NOTE — DISCHARGE NOTE PROVIDER - NSDCCPCAREPLAN_GEN_ALL_CORE_FT
PRINCIPAL DISCHARGE DIAGNOSIS  Diagnosis: Jaundice  Assessment and Plan of Treatment: Follow up with Dr Schneider for liver biopsy report      SECONDARY DISCHARGE DIAGNOSES  Diagnosis: Abdominal pain  Assessment and Plan of Treatment:     Diagnosis: Bandemia without diagnosis of specific infection  Assessment and Plan of Treatment:     Diagnosis: Macrocytic anemia  Assessment and Plan of Treatment:

## 2023-02-22 NOTE — DISCHARGE NOTE NURSING/CASE MANAGEMENT/SOCIAL WORK - NSDCPEFALRISK_GEN_ALL_CORE
For information on Fall & Injury Prevention, visit: [email or web address] OR  [email or web address] OR  https://www.cdc.gov/steadi/patient.html

## 2023-02-23 LAB — VISC SER: 2.3 — HIGH (ref 1.4–2.1)

## 2023-02-23 NOTE — CDI QUERY NOTE - NSCDINOTEQUERY_GEN_A_CORE
Document Clarification
What Is The Reason For Today's Visit?: History of Non-Melanoma Skin Cancer
How Many Skin Cancers Have You Had?: more than one
When Was Your Last Cancer Diagnosed?: 2017

## 2023-02-23 NOTE — CDI QUERY NOTE - NSCDIOTHERTXTBX_GEN_ALL_CORE_HH
The documentation in this medical record requires additional clarification to accurately capture the patient’s diagnosis/diagnoses, treatment and or severity of illness. Please document all corresponding diagnoses, either known or suspected, of the clinical indicators described below.    Supporting documentation:     WBC Count: 3.68 K/uL (02.22.23 @ 06:57); Hemoglobin: 8.3 g/dL (02.22.23 @ 06:57); Platelet Count - Automated: 51 K/uL (02.22.23 @ 06:57)    WBC Count: 3.90 K/uL (02.21.23 @ 07:11); Hemoglobin: 7.8 g/dL (02.21.23 @ 07:11); Platelet Count - Automated: 52 K/uL (02.21.23 @ 07:11)    WBC Count: 5.98 K/uL (02.19.23 @ 06:52); Hemoglobin: 8.2 g/dL (02.19.23 @ 06:52); Platelet Count - Automated: 75 K/uL (02.19.23 @ 06:52)     Discharge provider note 2/22/2023   Waldenstrom lymphoma dx 15 years ago on oral chemo trial Venetoclax  Jaundice  S/P liver biopsy yesterday, follow report as an outpatient  anemia and thrombocytopenia-stable  could be related with the immunotherapy drug venataclox or with the underlying lymphoma     Heme/Onc progress note 2/21/2023  - now on Venetoclax   - now with ongoing cytopenias likely sequalae for WM   - plt 52k today, Hb 7.8, WBC 3.9 today- bili rending down, LFTs normalized    Is there a corresponding medical diagnosis to accurately capture the above findings?      - Pancytopenia secondary to chemotherapy and Waldenstrom lymphoma  - Pancytopenia due to Waldenstrom lymphoma  - Other (please specify)

## 2023-02-27 LAB
% ALBUMIN: 37.6 % — SIGNIFICANT CHANGE UP
% ALPHA 1: 4.5 % — SIGNIFICANT CHANGE UP
% ALPHA 2: 6.7 % — SIGNIFICANT CHANGE UP
% BETA: 6.8 % — SIGNIFICANT CHANGE UP
% GAMMA: 44.4 % — SIGNIFICANT CHANGE UP
% M SPIKE: 35.4 % — SIGNIFICANT CHANGE UP
ALBUMIN SERPL ELPH-MCNC: 3.1 G/DL — LOW (ref 3.6–5.5)
ALBUMIN/GLOB SERPL ELPH: 0.6 RATIO — SIGNIFICANT CHANGE UP
ALPHA1 GLOB SERPL ELPH-MCNC: 0.4 G/DL — SIGNIFICANT CHANGE UP (ref 0.1–0.4)
ALPHA2 GLOB SERPL ELPH-MCNC: 0.5 G/DL — SIGNIFICANT CHANGE UP (ref 0.5–1)
B-GLOBULIN SERPL ELPH-MCNC: 0.6 G/DL — SIGNIFICANT CHANGE UP (ref 0.5–1)
GAMMA GLOBULIN: 3.6 G/DL — HIGH (ref 0.6–1.6)
INTERPRETATION SERPL IFE-IMP: SIGNIFICANT CHANGE UP
M-SPIKE: 2.9 G/DL — HIGH (ref 0–0)
PROT PATTERN SERPL ELPH-IMP: SIGNIFICANT CHANGE UP

## 2023-03-02 ENCOUNTER — TRANSCRIPTION ENCOUNTER (OUTPATIENT)
Age: 68
End: 2023-03-02

## 2023-03-13 LAB — SURGICAL PATHOLOGY STUDY: SIGNIFICANT CHANGE UP

## 2023-03-18 NOTE — CDI QUERY NOTE - NSCDIOTHERTXTBX_GEN_ALL_CORE_HH
The Physician's or Provider's documentation of the patient's presentation, evaluation and medical management, as identified below, may support a diagnosis that is not documented to the furthest specificity in the medical record. Please clarify in your progress notes and/or discharge summary if there is a corresponding diagnosis associated with the clinical information described below:    Could you please further clarify the etiology/etiologies of the jaundice      -Jaundice associated with hemochromatosis  -Jaundice associated with other drug/ toxin-induced liver injury    -Unable to rule out jaundice associated with hemochromatosis-and /or due to  with other drug/ toxin-induced liver injury   -Other please specify     SUPPORTING DOCUMENTATION AND/OR CLINICAL EVIDENCE:      Pathology finding-The findings are nonspecific as to etiology. Given the presence of hepatocellular iron in this biopsy, the possibility of primary iron overload disorders (e.g., hemochromatosis) cannot be excluded; correlation with genetic testing is suggested. The possibility of secondary iron overload conditions (e.g., transfusion-related) should also be considered. Given the above findings, the possibility of drug/ toxin-induced liver injury cannot be excluded. Correlation with complete medication list (including supplements) is suggested    GI-[birth year]-68F with Waldenstrom macroglobulinemia (WM) on treatment with progressive RUQ pain and weight loss. Hepatosplenomegaly on imaging, which may be due to WM or secondary to iron overload, amyloid deposition, or other etiologies. LFT pattern suggests infiltrative or obstructive causes. consider reducing dose of hepatically cleared drugs (ex. Cymbalta)    Hem-Onc 2/21-now with ongoing cytopenias likely sequalae for WM     DC summary-Hospital course: She was admitted to medical floor. She was seen by heme onc and also GI. CT abdomen and pelvis-no acute changes. MRCP-no obstruction. Found to have hepatospleenomegaly  concern forHemochromatosis. She was closely followed by hematology. Iron studies shows high ferritin and tranferrin levels. She went for liver biopsy yesterday. Patient anxious to go home. Abdomen pain much better, tolerating diet well. She was advised to follow up with oncology  for liver biopsy report. DC  Jaundice The Physician's or Provider's documentation of the patient's presentation, evaluation and medical management, as identified below, may support a diagnosis that is not documented to the furthest specificity in the medical record. Please clarify in your progress notes and/or discharge summary if there is a corresponding diagnosis associated with the clinical information described below:    Could you please further clarify the etiology/etiologies of the jaundice      -Jaundice, associated with hemochromatosis  -Jaundice multifactorial associated with other drug/ toxin-induced liver injury  ad hemochromatosis   -Unable to rule out jaundice associated with hemochromatosis-and /or due to  with other drug/ toxin-induced liver injury   -Other please specify     SUPPORTING DOCUMENTATION AND/OR CLINICAL EVIDENCE:      Pathology finding-The findings are nonspecific as to etiology. Given the presence of hepatocellular iron in this biopsy, the possibility of primary iron overload disorders (e.g., hemochromatosis) cannot be excluded; correlation with genetic testing is suggested. The possibility of secondary iron overload conditions (e.g., transfusion-related) should also be considered. Given the above findings, the possibility of drug/ toxin-induced liver injury cannot be excluded. Correlation with complete medication list (including supplements) is suggested    GI-[birth year]-68F with Waldenstrom macroglobulinemia (WM) on treatment with progressive RUQ pain and weight loss. Hepatosplenomegaly on imaging, which may be due to WM or secondary to iron overload, amyloid deposition, or other etiologies. LFT pattern suggests infiltrative or obstructive causes. consider reducing dose of hepatically cleared drugs (ex. Cymbalta)    Hem-Onc 2/21-now with ongoing cytopenias likely sequalae for WM     DC summary-Hospital course: She was admitted to medical floor. She was seen by heme onc and also GI. CT abdomen and pelvis-no acute changes. MRCP-no obstruction. Found to have hepatospleenomegaly  concern forHemochromatosis. She was closely followed by hematology. Iron studies shows high ferritin and tranferrin levels. She went for liver biopsy yesterday. Patient anxious to go home. Abdomen pain much better, tolerating diet well. She was advised to follow up with oncology  for liver biopsy report. DC  Jaundice

## 2023-04-08 ENCOUNTER — EMERGENCY (EMERGENCY)
Facility: HOSPITAL | Age: 68
LOS: 0 days | Discharge: HOME | End: 2023-04-08
Attending: EMERGENCY MEDICINE
Payer: MEDICARE

## 2023-04-08 VITALS — WEIGHT: 190.04 LBS | HEIGHT: 62 IN

## 2023-04-08 VITALS
HEART RATE: 82 BPM | TEMPERATURE: 98 F | RESPIRATION RATE: 16 BRPM | OXYGEN SATURATION: 100 % | DIASTOLIC BLOOD PRESSURE: 72 MMHG | SYSTOLIC BLOOD PRESSURE: 129 MMHG

## 2023-04-08 DIAGNOSIS — Z92.21 PERSONAL HISTORY OF ANTINEOPLASTIC CHEMOTHERAPY: ICD-10-CM

## 2023-04-08 DIAGNOSIS — Z85.72 PERSONAL HISTORY OF NON-HODGKIN LYMPHOMAS: ICD-10-CM

## 2023-04-08 DIAGNOSIS — F32.9 MAJOR DEPRESSIVE DISORDER, SINGLE EPISODE, UNSPECIFIED: ICD-10-CM

## 2023-04-08 DIAGNOSIS — Z85.79 PERSONAL HISTORY OF OTHER MALIGNANT NEOPLASMS OF LYMPHOID, HEMATOPOIETIC AND RELATED TISSUES: ICD-10-CM

## 2023-04-08 DIAGNOSIS — D69.6 THROMBOCYTOPENIA, UNSPECIFIED: ICD-10-CM

## 2023-04-08 DIAGNOSIS — E03.9 HYPOTHYROIDISM, UNSPECIFIED: ICD-10-CM

## 2023-04-08 DIAGNOSIS — Z88.8 ALLERGY STATUS TO OTHER DRUGS, MEDICAMENTS AND BIOLOGICAL SUBSTANCES: ICD-10-CM

## 2023-04-08 LAB
ALBUMIN SERPL ELPH-MCNC: 2.7 G/DL — LOW (ref 3.3–5)
ALP SERPL-CCNC: 340 U/L — HIGH (ref 40–120)
ALT FLD-CCNC: 33 U/L — SIGNIFICANT CHANGE UP (ref 12–78)
ANION GAP SERPL CALC-SCNC: 3 MMOL/L — LOW (ref 5–17)
ANISOCYTOSIS BLD QL: SIGNIFICANT CHANGE UP
AST SERPL-CCNC: 46 U/L — HIGH (ref 15–37)
BASOPHILS # BLD AUTO: 0 K/UL — SIGNIFICANT CHANGE UP (ref 0–0.2)
BASOPHILS NFR BLD AUTO: 0 % — SIGNIFICANT CHANGE UP (ref 0–2)
BILIRUB SERPL-MCNC: 2.7 MG/DL — HIGH (ref 0.2–1.2)
BLD GP AB SCN SERPL QL: SIGNIFICANT CHANGE UP
BUN SERPL-MCNC: 27 MG/DL — HIGH (ref 7–23)
CALCIUM SERPL-MCNC: 9.2 MG/DL — SIGNIFICANT CHANGE UP (ref 8.5–10.1)
CHLORIDE SERPL-SCNC: 109 MMOL/L — HIGH (ref 96–108)
CO2 SERPL-SCNC: 26 MMOL/L — SIGNIFICANT CHANGE UP (ref 22–31)
CREAT SERPL-MCNC: 0.62 MG/DL — SIGNIFICANT CHANGE UP (ref 0.5–1.3)
DACRYOCYTES BLD QL SMEAR: SLIGHT — SIGNIFICANT CHANGE UP
EGFR: 96 ML/MIN/1.73M2 — SIGNIFICANT CHANGE UP
EOSINOPHIL # BLD AUTO: 0 K/UL — SIGNIFICANT CHANGE UP (ref 0–0.5)
EOSINOPHIL NFR BLD AUTO: 0 % — SIGNIFICANT CHANGE UP (ref 0–6)
GLUCOSE SERPL-MCNC: 95 MG/DL — SIGNIFICANT CHANGE UP (ref 70–99)
HCT VFR BLD CALC: 22.7 % — LOW (ref 34.5–45)
HGB BLD-MCNC: 7.3 G/DL — LOW (ref 11.5–15.5)
LYMPHOCYTES # BLD AUTO: 0.8 K/UL — LOW (ref 1–3.3)
LYMPHOCYTES # BLD AUTO: 31 % — SIGNIFICANT CHANGE UP (ref 13–44)
MACROCYTES BLD QL: SIGNIFICANT CHANGE UP
MANUAL SMEAR VERIFICATION: SIGNIFICANT CHANGE UP
MCHC RBC-ENTMCNC: 32.2 GM/DL — SIGNIFICANT CHANGE UP (ref 32–36)
MCHC RBC-ENTMCNC: 36.5 PG — HIGH (ref 27–34)
MCV RBC AUTO: 113.5 FL — HIGH (ref 80–100)
METAMYELOCYTES # FLD: 2 % — HIGH (ref 0–0)
MONOCYTES # BLD AUTO: 0.51 K/UL — SIGNIFICANT CHANGE UP (ref 0–0.9)
MONOCYTES NFR BLD AUTO: 20 % — HIGH (ref 2–14)
MYELOCYTES NFR BLD: 2 % — HIGH (ref 0–0)
NEUTROPHILS # BLD AUTO: 1.16 K/UL — LOW (ref 1.8–7.4)
NEUTROPHILS NFR BLD AUTO: 40 % — LOW (ref 43–77)
NEUTS BAND # BLD: 5 % — SIGNIFICANT CHANGE UP (ref 0–8)
NRBC # BLD: 2 /100 — HIGH (ref 0–0)
NRBC # BLD: SIGNIFICANT CHANGE UP /100 WBCS (ref 0–0)
OVALOCYTES BLD QL SMEAR: SLIGHT — SIGNIFICANT CHANGE UP
PLAT MORPH BLD: NORMAL — SIGNIFICANT CHANGE UP
PLATELET # BLD AUTO: 14 K/UL — CRITICAL LOW (ref 150–400)
POIKILOCYTOSIS BLD QL AUTO: SLIGHT — SIGNIFICANT CHANGE UP
POLYCHROMASIA BLD QL SMEAR: SLIGHT — SIGNIFICANT CHANGE UP
POTASSIUM SERPL-MCNC: 4.3 MMOL/L — SIGNIFICANT CHANGE UP (ref 3.5–5.3)
POTASSIUM SERPL-SCNC: 4.3 MMOL/L — SIGNIFICANT CHANGE UP (ref 3.5–5.3)
PROT SERPL-MCNC: 6.5 GM/DL — SIGNIFICANT CHANGE UP (ref 6–8.3)
RBC # BLD: 2 M/UL — LOW (ref 3.8–5.2)
RBC # FLD: 16 % — HIGH (ref 10.3–14.5)
RBC BLD AUTO: ABNORMAL
SCHISTOCYTES BLD QL AUTO: SLIGHT — SIGNIFICANT CHANGE UP
SODIUM SERPL-SCNC: 138 MMOL/L — SIGNIFICANT CHANGE UP (ref 135–145)
WBC # BLD: 2.57 K/UL — LOW (ref 3.8–10.5)
WBC # FLD AUTO: 2.57 K/UL — LOW (ref 3.8–10.5)

## 2023-04-08 PROCEDURE — 93005 ELECTROCARDIOGRAM TRACING: CPT

## 2023-04-08 PROCEDURE — 86901 BLOOD TYPING SEROLOGIC RH(D): CPT

## 2023-04-08 PROCEDURE — 36415 COLL VENOUS BLD VENIPUNCTURE: CPT

## 2023-04-08 PROCEDURE — 86900 BLOOD TYPING SEROLOGIC ABO: CPT

## 2023-04-08 PROCEDURE — 93010 ELECTROCARDIOGRAM REPORT: CPT

## 2023-04-08 PROCEDURE — 99285 EMERGENCY DEPT VISIT HI MDM: CPT | Mod: 25

## 2023-04-08 PROCEDURE — P9037: CPT

## 2023-04-08 PROCEDURE — P9100: CPT

## 2023-04-08 PROCEDURE — 86850 RBC ANTIBODY SCREEN: CPT

## 2023-04-08 PROCEDURE — 99285 EMERGENCY DEPT VISIT HI MDM: CPT | Mod: FS

## 2023-04-08 PROCEDURE — 80053 COMPREHEN METABOLIC PANEL: CPT

## 2023-04-08 PROCEDURE — 85025 COMPLETE CBC W/AUTO DIFF WBC: CPT

## 2023-04-08 PROCEDURE — 36430 TRANSFUSION BLD/BLD COMPNT: CPT

## 2023-04-08 NOTE — ED PROVIDER NOTE - NSFOLLOWUPINSTRUCTIONS_ED_ALL_ED_FT
1. return for worsening symptoms or anything concerning to you  2. take all home meds as prescribed  3. follow up with your pmd call to make an appointment  4. follow up with your heme/onc    Thrombocytopenia  Thrombocytopenia is a condition in which there are a low number of platelets in the blood. Platelets are also called thrombocytes. Platelets are parts of blood that stick together and form a clot to help the body stop bleeding after an injury.    If you have too few platelets, your blood may have trouble clotting. This may cause you to bleed and bruise very easily. Some cases of thrombocytopenia are mild while others are more severe.    What are the causes?  This condition is caused by a low number of platelets in your blood. There are three main reasons for this:  Your body not making enough platelets. This may be caused by:  Bone marrow diseases. This include aplastic anemia, leukemia, and myelodysplastic anemia.  Congenital thrombocytopenia. This is a condition that is passed from parent to child (inherited).  Certain cancer treatments, including chemotherapy and radiation therapy.  Infections from bacteria or viruses.  Alcohol use disorder and alcoholism.  Platelets not being released in the blood. This is called platelet sequestration and it can happen due to:  An overactive spleen (hypersplenism). The spleen gathers up platelets from circulation, meaning that the platelets are not available to help with clotting your blood. The spleen can be enlarged because of scarring or other conditions.  Gaucher disease.  Your body destroying platelets too quickly. This may be caused by:  An autoimmune disease that causes immune thrombocytopenia (ITP). ITP is sometimes associated with other autoimmune conditions such as lupus.  Certain medicines, such as blood thinners.  Certain blood clotting or bleeding disorders.  Exposure to toxic chemicals, such as pesticides, lead, benzene, and arsenic.  Pregnancy.  What are the signs or symptoms?  Symptoms of this condition are the result of poor blood clotting. They will vary depending on how low the platelet counts are. Symptoms may include:  Bruising easily.  Bleeding from the mouth or nose.  Heavy menstrual periods.  Blood in the urine, stool (feces), or vomit.  Purplish-red discolorations on the skin (purpura).  A rash that looks like pinpoint, purplish-red spots (petechiae) on the lower legs.  How is this diagnosed?  A person having a blood sample taken from the arm.  This condition may be diagnosed with blood tests and a physical exam.    You may also have other tests, including:  A sample of bone marrow (biopsy) may be removed to look for the original cells that make platelets.  An ultrasound or CT scan of the abdomen to check for an enlarged spleen, enlarged lymph nodes, or liver problems.  How is this treated?  Treatment for this condition depends on the cause. Treatment may include:  Treatment of another condition that is causing the low platelet count.  Medicines to help protect your platelets from being destroyed.  A replacement (transfusion) of platelets to stop or prevent bleeding.  Surgery to remove the spleen.  Follow these instructions at home:  Medicines    Take over-the-counter and prescription medicines only as told by your health care provider.  Do not take any medicines that contain aspirin or NSAIDs, such as ibuprofen. These medicines increase your risk for dangerous bleeding.  Activity    Avoid activities that could cause injury or bruising, and follow instructions about how to prevent falls.  Do not play contact sports. Ask your health care provider what activities are safe for you.  Take extra care to protect yourself from burns when ironing or cooking.  Take extra care not to cut yourself when you shave or when you use scissors, needles, knives, and other tools.  General instructions    Medical alert bracelet.  Check your skin and the inside of your mouth for bruising or bleeding as told by your health care provider.  Wear a medical alert bracelet that says that you have a bleeding disorder. This can help you get the treatment you need in case of emergency.  Check your urine and stool for blood as told by your health care provider.  Do not drink alcohol. If you do drink alcohol, limit the amount that you drink.  Minimize contact with toxic chemicals.  Tell all your health care providers, including dental care providers and eye doctors, about your condition. Make sure to tell dental care providers before you have any procedure done, including dental cleanings.  Keep all follow-up visits. This is important.  Contact a health care provider if:  You have unexplained bruising.  You have new symptoms.  You have symptoms that get worse.  You have a fever.  Get help right away if:  You have severe bleeding from anywhere on your body.  You have blood in your vomit, urine, or stool.  You have an injury to your head.  You have a sudden, severe headache.  Summary  Thrombocytopenia is a condition in which you have a low number of platelets in the blood.  Platelets are parts of blood that stick together to form a clot.  Symptoms of this condition are the result of poor blood clotting and may include bruising easily, bleeding from the nose or mouth, petechiae, and purpura.  This condition may be diagnosed with blood tests and a physical exam.  Treatment for this condition depends on the cause.  This information is not intended to replace advice given to you by your health care provider. Make sure you discuss any questions you have with your health care provider.

## 2023-04-08 NOTE — ED PROVIDER NOTE - PATIENT PORTAL LINK FT
You can access the FollowMyHealth Patient Portal offered by [facility] by registering at the following website: [email or web address]. By joining [facility]’s FollowMyHealth portal, you will also be able to view your health information using other applications (apps) compatible with our system.

## 2023-04-08 NOTE — ED PROVIDER NOTE - NS ED ROS FT
Constitutional: No fever, no chills  Eyes: No visual changes  HEENT: No throat pain  CV: No chest pain  Resp: No SOB, no cough  GI: No abdominal pain, no nausea, no vomiting  : No dysuria  MSK: No musculoskeletal pain  Skin: No rash  Neuro: No headache

## 2023-04-08 NOTE — ED ADULT TRIAGE NOTE - CHIEF COMPLAINT QUOTE
sent by Jojo Arthur MD at [facility] for platelets transfusion. pt. reports feeling weak. no other complaints at this time

## 2023-04-08 NOTE — ED PROVIDER NOTE - ATTENDING APP SHARED VISIT CONTRIBUTION OF CARE
I, Zane Sterling MD, personally saw the patient with BRITTANIE.  I have personally performed a face to face diagnostic evaluation on this patient.  I have reviewed the BRITTANIE note and agree with the history, exam, and plan of care, except as noted.

## 2023-04-08 NOTE — ED ADULT NURSE NOTE - CHIEF COMPLAINT QUOTE
sent by Jojo Arthur MD at [facility] for platelets transfusion. pt. reports feeling weak. no other complaints at this time

## 2023-04-08 NOTE — ED PROVIDER NOTE - PHYSICAL EXAMINATION
Constitutional: NAD AAOx3  Eyes: PERRLA, Eyes clear B/L  Head: Normocephalic atraumatic  EMNT: Airway patent, MMM  Cardiac: RRR, S1S2, No murmurs, rubs, or gallops  Resp: Lungs CTAB  GI: Soft, non-tender, non-distended  Neuro: CN2-12 intact  MSK: Spine appears normal, ROM not limited, no muscle or joint tenderness  Skin: Warm, dry, intact. No visible rashes

## 2023-04-08 NOTE — ED PROVIDER NOTE - CLINICAL SUMMARY MEDICAL DECISION MAKING FREE TEXT BOX
67 y/o F with waldenstrom's macroglobulinemia sent to ED by [facility] for low platelets. Basic labs, type and screen, will obtain informed consent and transfuse if low plt count.

## 2023-04-08 NOTE — ED PROVIDER NOTE - NSICDXPASTMEDICALHX_GEN_ALL_CORE_FT
PAST MEDICAL HISTORY:  Lymphoma     Major depressive disorder     Waldenstrom macroglobulinemia

## 2023-04-08 NOTE — ED PROVIDER NOTE - OBJECTIVE STATEMENT
67 y/o F with PMH waldenstrom macroglobulinemia x 15 years treated with oral chemo, hypothyroid, and depression sent to ED by [facility] for platelet transfusion. Pt had routine labs drawn so can start new chemo drug, platelets found to be low. Pt has hx of platelet transfusions with no adverse rxns. Pt does not present with any other symptoms or complaints.

## 2023-04-08 NOTE — ED PROVIDER NOTE - PROGRESS NOTE DETAILS
Patient was signed out to me pending reassessment after platelet transfusion.  Platelets have been transfused patient feels well I spoke with patient she denies any bleeding or traumatic injury.  She is aware of her blood work.  As per prior ED team they spoke with heme-onc and they are aware of the lab results patient wants to go home vitals are stable will discharge with follow-up and strict return precautions.. Jaycob Hunt M.D., Attending Physician

## 2023-04-08 NOTE — ED ADULT TRIAGE NOTE - WEIGHT IN KG
----- Message from Merry Bass MD sent at 11/13/2022  7:28 PM CST -----  No indications that infection present.  Possible B12 deficiency  Consider B12 level if not done with GI  
Left message for pt to advise that dr Bass placed order for vit b12.   
Spoke to pt about la result and recommendation. Pt stated did not have done with GI. Please place order, he wants to go to [facility]   
86.2

## 2023-04-25 ENCOUNTER — EMERGENCY (EMERGENCY)
Facility: HOSPITAL | Age: 68
LOS: 0 days | Discharge: ANOTHER HOSPITAL | End: 2023-04-25
Attending: EMERGENCY MEDICINE
Payer: MEDICARE

## 2023-04-25 VITALS
DIASTOLIC BLOOD PRESSURE: 55 MMHG | SYSTOLIC BLOOD PRESSURE: 98 MMHG | OXYGEN SATURATION: 99 % | RESPIRATION RATE: 18 BRPM | TEMPERATURE: 98 F

## 2023-04-25 VITALS
OXYGEN SATURATION: 94 % | RESPIRATION RATE: 19 BRPM | TEMPERATURE: 98 F | HEART RATE: 88 BPM | DIASTOLIC BLOOD PRESSURE: 40 MMHG | SYSTOLIC BLOOD PRESSURE: 108 MMHG

## 2023-04-25 DIAGNOSIS — C88.0 WALDENSTROM MACROGLOBULINEMIA: ICD-10-CM

## 2023-04-25 DIAGNOSIS — F32.9 MAJOR DEPRESSIVE DISORDER, SINGLE EPISODE, UNSPECIFIED: ICD-10-CM

## 2023-04-25 DIAGNOSIS — Z88.1 ALLERGY STATUS TO OTHER ANTIBIOTIC AGENTS STATUS: ICD-10-CM

## 2023-04-25 DIAGNOSIS — R60.0 LOCALIZED EDEMA: ICD-10-CM

## 2023-04-25 DIAGNOSIS — D69.3 IMMUNE THROMBOCYTOPENIC PURPURA: ICD-10-CM

## 2023-04-25 DIAGNOSIS — Z85.72 PERSONAL HISTORY OF NON-HODGKIN LYMPHOMAS: ICD-10-CM

## 2023-04-25 LAB
ALBUMIN SERPL ELPH-MCNC: 2.2 G/DL — LOW (ref 3.3–5)
ALP SERPL-CCNC: 475 U/L — HIGH (ref 40–120)
ALT FLD-CCNC: 27 U/L — SIGNIFICANT CHANGE UP (ref 12–78)
ANISOCYTOSIS BLD QL: SIGNIFICANT CHANGE UP
APPEARANCE UR: CLEAR — SIGNIFICANT CHANGE UP
AST SERPL-CCNC: 56 U/L — HIGH (ref 15–37)
BACTERIA # UR AUTO: ABNORMAL
BASO STIPL BLD QL SMEAR: PRESENT — SIGNIFICANT CHANGE UP
BASOPHILS # BLD AUTO: 0 K/UL — SIGNIFICANT CHANGE UP (ref 0–0.2)
BASOPHILS NFR BLD AUTO: 0 % — SIGNIFICANT CHANGE UP (ref 0–2)
BILIRUB SERPL-MCNC: 3.2 MG/DL — HIGH (ref 0.2–1.2)
BILIRUB UR-MCNC: ABNORMAL
BUN SERPL-MCNC: 19 MG/DL — SIGNIFICANT CHANGE UP (ref 7–23)
CALCIUM SERPL-MCNC: 8.4 MG/DL — LOW (ref 8.5–10.1)
CHLORIDE SERPL-SCNC: 107 MMOL/L — SIGNIFICANT CHANGE UP (ref 96–108)
CO2 SERPL-SCNC: 32 MMOL/L — HIGH (ref 22–31)
COLOR SPEC: YELLOW — SIGNIFICANT CHANGE UP
COMMENT - URINE: SIGNIFICANT CHANGE UP
CREAT SERPL-MCNC: 0.49 MG/DL — LOW (ref 0.5–1.3)
DIFF PNL FLD: ABNORMAL
EGFR: 102 ML/MIN/1.73M2 — SIGNIFICANT CHANGE UP
EOSINOPHIL # BLD AUTO: 0 K/UL — SIGNIFICANT CHANGE UP (ref 0–0.5)
EOSINOPHIL NFR BLD AUTO: 0 % — SIGNIFICANT CHANGE UP (ref 0–6)
EPI CELLS # UR: NEGATIVE — SIGNIFICANT CHANGE UP
GLUCOSE SERPL-MCNC: 105 MG/DL — HIGH (ref 70–99)
GLUCOSE UR QL: NEGATIVE — SIGNIFICANT CHANGE UP
HCT VFR BLD CALC: 22.8 % — LOW (ref 34.5–45)
HGB BLD-MCNC: 7.7 G/DL — LOW (ref 11.5–15.5)
KETONES UR-MCNC: ABNORMAL
LACTATE SERPL-SCNC: 1.4 MMOL/L — SIGNIFICANT CHANGE UP (ref 0.7–2)
LEUKOCYTE ESTERASE UR-ACNC: ABNORMAL
LYMPHOCYTES # BLD AUTO: 0.84 K/UL — LOW (ref 1–3.3)
LYMPHOCYTES # BLD AUTO: 37 % — SIGNIFICANT CHANGE UP (ref 13–44)
MACROCYTES BLD QL: SLIGHT — SIGNIFICANT CHANGE UP
MANUAL SMEAR VERIFICATION: SIGNIFICANT CHANGE UP
MCHC RBC-ENTMCNC: 32.5 PG — SIGNIFICANT CHANGE UP (ref 27–34)
MCHC RBC-ENTMCNC: 33.8 GM/DL — SIGNIFICANT CHANGE UP (ref 32–36)
MCV RBC AUTO: 96.2 FL — SIGNIFICANT CHANGE UP (ref 80–100)
MONOCYTES # BLD AUTO: 0.11 K/UL — SIGNIFICANT CHANGE UP (ref 0–0.9)
MONOCYTES NFR BLD AUTO: 5 % — SIGNIFICANT CHANGE UP (ref 2–14)
NEUTROPHILS # BLD AUTO: 1.29 K/UL — LOW (ref 1.8–7.4)
NEUTROPHILS NFR BLD AUTO: 55 % — SIGNIFICANT CHANGE UP (ref 43–77)
NEUTS BAND # BLD: 2 % — SIGNIFICANT CHANGE UP (ref 0–8)
NITRITE UR-MCNC: NEGATIVE — SIGNIFICANT CHANGE UP
NRBC # BLD: 0 /100 — SIGNIFICANT CHANGE UP (ref 0–0)
NRBC # BLD: SIGNIFICANT CHANGE UP /100 WBCS (ref 0–0)
OVALOCYTES BLD QL SMEAR: SLIGHT — SIGNIFICANT CHANGE UP
PH UR: 6.5 — SIGNIFICANT CHANGE UP (ref 5–8)
PLAT MORPH BLD: NORMAL — SIGNIFICANT CHANGE UP
PLATELET # BLD AUTO: 4 K/UL — CRITICAL LOW (ref 150–400)
POLYCHROMASIA BLD QL SMEAR: SLIGHT — SIGNIFICANT CHANGE UP
POTASSIUM SERPL-MCNC: 4.8 MMOL/L — SIGNIFICANT CHANGE UP (ref 3.5–5.3)
POTASSIUM SERPL-SCNC: 4.8 MMOL/L — SIGNIFICANT CHANGE UP (ref 3.5–5.3)
PROT SERPL-MCNC: 5.7 GM/DL — LOW (ref 6–8.3)
PROT UR-MCNC: NEGATIVE — SIGNIFICANT CHANGE UP
RBC # BLD: 2.37 M/UL — LOW (ref 3.8–5.2)
RBC # FLD: 18.4 % — HIGH (ref 10.3–14.5)
RBC BLD AUTO: ABNORMAL
RBC CASTS # UR COMP ASSIST: NEGATIVE /HPF — SIGNIFICANT CHANGE UP (ref 0–4)
SODIUM SERPL-SCNC: 138 MMOL/L — SIGNIFICANT CHANGE UP (ref 135–145)
SP GR SPEC: 1.01 — SIGNIFICANT CHANGE UP (ref 1.01–1.02)
STOMATOCYTES BLD QL SMEAR: SLIGHT — SIGNIFICANT CHANGE UP
TOXIC GRANULES BLD QL SMEAR: PRESENT — SIGNIFICANT CHANGE UP
UROBILINOGEN FLD QL: 1
VARIANT LYMPHS # BLD: 1 % — SIGNIFICANT CHANGE UP (ref 0–6)
WBC # BLD: 2.27 K/UL — LOW (ref 3.8–10.5)
WBC # FLD AUTO: 2.27 K/UL — LOW (ref 3.8–10.5)
WBC UR QL: SIGNIFICANT CHANGE UP /HPF (ref 0–5)

## 2023-04-25 PROCEDURE — 71045 X-RAY EXAM CHEST 1 VIEW: CPT

## 2023-04-25 PROCEDURE — 36430 TRANSFUSION BLD/BLD COMPNT: CPT

## 2023-04-25 PROCEDURE — P9100: CPT

## 2023-04-25 PROCEDURE — 83605 ASSAY OF LACTIC ACID: CPT

## 2023-04-25 PROCEDURE — 80053 COMPREHEN METABOLIC PANEL: CPT

## 2023-04-25 PROCEDURE — 36415 COLL VENOUS BLD VENIPUNCTURE: CPT

## 2023-04-25 PROCEDURE — 93010 ELECTROCARDIOGRAM REPORT: CPT

## 2023-04-25 PROCEDURE — 99291 CRITICAL CARE FIRST HOUR: CPT

## 2023-04-25 PROCEDURE — 93005 ELECTROCARDIOGRAM TRACING: CPT

## 2023-04-25 PROCEDURE — 81001 URINALYSIS AUTO W/SCOPE: CPT

## 2023-04-25 PROCEDURE — P9037: CPT

## 2023-04-25 PROCEDURE — 99291 CRITICAL CARE FIRST HOUR: CPT | Mod: 25

## 2023-04-25 PROCEDURE — 85025 COMPLETE CBC W/AUTO DIFF WBC: CPT

## 2023-04-25 PROCEDURE — 71045 X-RAY EXAM CHEST 1 VIEW: CPT | Mod: 26

## 2023-04-25 NOTE — ED PROVIDER NOTE - NS ED ROS FT
Constitutional: No fevers, chills, or sweats +fatigue  Cardiac: No chest pain, exertional dyspnea, orthopnea  Respiratory: No shortness of breath, no cough  GI: No abdominal pain, no N/V/D  Neuro: No headaches, no neck pain/stiffness, no numbness  All other systems reviewed and are negative unless otherwise stated in the HPI.

## 2023-04-25 NOTE — ED ADULT NURSE NOTE - OBJECTIVE STATEMENT
Pt presents to the ED for abnormal labs and fatigue. Pt had blood work today showing a platelet count of 3 and WBC count of 1.3. Pt told to come to the ED for a transfusion by oncologist Dr. Bruno. Left upper arm PICC line in place. PMH of non-Hodgkin's lymphoma. Pt is alert and oriented, answering questions appropriately, pt is visibly upset and anxious, son at bedside. No c/o pain, NAD.

## 2023-04-25 NOTE — ED PROVIDER NOTE - CLINICAL SUMMARY MEDICAL DECISION MAKING FREE TEXT BOX
Patient sent in by oncology team for platelet transfusion also c/o worsening fatigue. Plan for basic labs, platelet transfusion, discuss with patient oncologist and reassess. Patient sent in by oncology team for platelet transfusion also c/o worsening fatigue. Plan for basic labs, platelet transfusion, discuss with patient oncologist and reassess.  Hallie - discussed case with oncology team (PA for Dr Urias), rec trx of Plt here and transfer to [facility] ED in [facility] for re-admission there. Pt and hiusband agreeable [facility] plan.

## 2023-04-25 NOTE — ED PROVIDER NOTE - OBJECTIVE STATEMENT
68 year old female with PMHx of non-hodgkin's lymphoma on chemo presents to the ED sent by oncologist Dr. Bruno for platelet transfusion. Pt went to [facility] where outpt labs today showed platelet count of 3 and WBC of 1.3, told to come to ED for platelet transfusion by oncologist. Pt was admitted for x10 days for low WBC and platelet count, d/c last night on oral abx and left PICC line in place. Was d/c and was to be seen at [facility] today where labs showed platelet count of 3 and WBC of 1.3, sent to ED for transfusion. Pt currently c/o sudden onset of fatigue since being d/c and b/l leg swelling. Pt has had LE ultrasounds done recently, negative for DVT. Denies AC use. Denies fever, SOB, chest pain. 68 year old female with PMHx of waldenstrom macroglobulinemia on oral chemo presents to the ED sent by oncologist Dr. Bruno for platelet transfusion. Pt went to [facility] where outpt labs today showed platelet count of 3 and WBC of 1.3, told to come to ED for platelet transfusion by oncologist. Pt was admitted for x10 days for low WBC and platelet count, d/c last night on oral abx and left PICC line in place. Was d/c and was to be seen at [facility] today where labs showed platelet count of 3 and WBC of 1.3, sent to ED for transfusion. Pt currently c/o sudden onset of fatigue since being d/c and b/l leg swelling. Pt has had LE ultrasounds done recently, negative for DVT. Denies AC use. Denies fever, SOB, chest pain.

## 2023-04-25 NOTE — ED ADULT NURSE REASSESSMENT NOTE - NS ED NURSE REASSESS COMMENT FT1
Plt transfusion started at 2058. Pt educated on s/s of transfusion reaction. VSS. Pt shows no s/s of distress at 15 min check @ 2113, VSS, Pt transferred via EMS to [facility].

## 2023-04-25 NOTE — ED ADULT TRIAGE NOTE - CHIEF COMPLAINT QUOTE
Pt presents to the ED for abnormal labs and fatigue. Pt had blood work today showing a platelet count of 3 and WBC count of 1.3. Pt told to come to the ED for a transfusion by oncologist Dr. Bruno. Left PICC line in place. PMH of non-Hodgkin's lymphoma.

## 2023-04-25 NOTE — ED PROVIDER NOTE - PHYSICAL EXAMINATION
General: AAOx3, NAD  HEENT: NCAT  Cardiac: Normal rate and rhythym, no murmurs, normal peripheral perfusion  Respiratory: Normal rate and effort. CTAB  GI: Soft, nondistended, nontender  Neuro: No focal deficits. SCOTT equally x4, sensation to light touch intact throughout  MSK: FROMx4, no focal bony tenderness,  2+ pitting edema BLE  Skin: No rash

## 2023-06-01 NOTE — ED STATDOCS - CONSTITUTIONAL, MLM
6/1/2023    [facility]  1940    This resident is being seen today for a follow-up evaluation visit. 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 She is a 79 y.o. female resident who is being seen today for a follow-up evaluation with which this resident has been monitoring her blood sugars accordingly with recent initiation of metformin. She is doing relatively well in this regard with intermittent hyperglycemic episodes. She does furthermore remain under assessment for balance issues and indicates that they are periodic and that she has not started her exercising program as of yet. She denies any headaches or dizziness, sore throat, chest pain, coughing or shortness of breath, nausea or vomiting, constipation or diarrhea, fever or chills, falls or syncopal events. Medications:  Atorvastatin 20 mg at bedtime  Omeprazole 40 mg twice daily   Symbicort 160/4.5 mcg 2 puff twice daily twice daily   Proair 2 puffs twice daily   Singulair 10 mg daily  Tylenol every 4 hours as needed   vitamin D 5 2000 units daily  Zyrtec 10 mg daily as needed  Trazodone 75 mg at bedtime  Vesicare 5 mg daily  Metformin 500 mg every morning        Objective     Vital Signs: /72 pulse 102 respirations 19 temperature 97.6 O2 95% weight 195.6 pounds        Physical examination:Skin is essentially warm and dry. HEENT unremarkable. Neck is supple. Heart regular rate and rhythm. No rubs, gallops or murmurs noted. normal... well appearing and in no apparent distress.

## 2023-12-15 ENCOUNTER — APPOINTMENT (OUTPATIENT)
Dept: OBGYN | Facility: CLINIC | Age: 68
End: 2023-12-15
Payer: MEDICARE

## 2023-12-15 VITALS
BODY MASS INDEX: 34.96 KG/M2 | WEIGHT: 190 LBS | RESPIRATION RATE: 15 BRPM | SYSTOLIC BLOOD PRESSURE: 120 MMHG | DIASTOLIC BLOOD PRESSURE: 70 MMHG | HEIGHT: 62 IN | HEART RATE: 74 BPM

## 2023-12-15 DIAGNOSIS — Z00.00 ENCOUNTER FOR GENERAL ADULT MEDICAL EXAMINATION W/OUT ABNORMAL FINDINGS: ICD-10-CM

## 2023-12-15 DIAGNOSIS — Z12.39 ENCOUNTER FOR OTHER SCREENING FOR MALIGNANT NEOPLASM OF BREAST: ICD-10-CM

## 2023-12-15 PROCEDURE — G0101: CPT

## 2023-12-18 LAB — HPV HIGH+LOW RISK DNA PNL CVX: NOT DETECTED

## 2023-12-19 NOTE — DISCUSSION/SUMMARY
[FreeTextEntry1] : 67 YO FEMALE PRESENTS FOR ANNUAL GYN EXAMINATION. SELF BREAST EXAMINATION TAUGHT. MAMMOGRAM ORDERED. EXERCISE, CALCIUM AND VITAMIN D3 SUPPLEMENTATION DISCUSSED. BONE DENSITY STUDY AND INDICATIONS REVIEWED. COLONOSCOPY HISTORY REVIEWED AND DISCUSSED FOLLOWUP. RECOMMENDED PT F/U WITH UROGYNECOLOGY- PT ALREADY HAS REFERRAL. NO EVIDENCE OF VAGINAL INFECTION- WILL TREAT BASED ON PAP.

## 2023-12-19 NOTE — HISTORY OF PRESENT ILLNESS
[FreeTextEntry1] : 67 YO F PRESENTS FOR ANNUAL VISIT. PT RECEIEVES TREATMENT WITH CHEMOTHERAPY FOR LYMPHOMA AND IS ON CHRONIC BACTRIM. PT STATES SHE IS CONSTANTLY ON ANTIBIOTICS AND GETS RECURRENT VAGINAL INFECTIONS AS A RESULT.   LPAP 10/07/21: NORMAL LMAMMO 05/10/22: BIRADS-2   MHX: LYMPHOMA- RECEIVES [place]APY [place] [facility], LOWER EXTREMITY CELLULITIS

## 2023-12-23 LAB — CYTOLOGY CVX/VAG DOC THIN PREP: ABNORMAL

## 2024-12-06 NOTE — ED ADULT NURSE NOTE - CAS EDN DISCHARGE ASSESSMENT
Post procedure phone call completed; patient understood all discharge paperwork. No questions regarding medications or pain management. MD follow up appointment made. Patient was able to rest when they were discharged. Patient will recommend [facility], no complaints of hospital stay, satisfied with care. Instructed patient to contact provider with any further questions or concerns.
Alert and oriented to person, place and time

## 2025-09-09 ENCOUNTER — NON-APPOINTMENT (OUTPATIENT)
Age: 70
End: 2025-09-09

## 2025-09-18 ENCOUNTER — APPOINTMENT (OUTPATIENT)
Dept: OBGYN | Facility: CLINIC | Age: 70
End: 2025-09-18